# Patient Record
Sex: FEMALE | Race: WHITE | NOT HISPANIC OR LATINO | Employment: UNEMPLOYED | URBAN - METROPOLITAN AREA
[De-identification: names, ages, dates, MRNs, and addresses within clinical notes are randomized per-mention and may not be internally consistent; named-entity substitution may affect disease eponyms.]

---

## 2018-03-13 ENCOUNTER — OFFICE VISIT (OUTPATIENT)
Dept: OBSTETRICS AND GYNECOLOGY | Facility: CLINIC | Age: 48
End: 2018-03-13
Payer: MEDICAID

## 2018-03-13 VITALS
BODY MASS INDEX: 41.67 KG/M2 | HEIGHT: 63 IN | HEART RATE: 76 BPM | WEIGHT: 235.19 LBS | SYSTOLIC BLOOD PRESSURE: 132 MMHG | RESPIRATION RATE: 16 BRPM | DIASTOLIC BLOOD PRESSURE: 84 MMHG

## 2018-03-13 DIAGNOSIS — Z12.4 CERVICAL CANCER SCREENING: ICD-10-CM

## 2018-03-13 DIAGNOSIS — Z12.39 SCREENING FOR BREAST CANCER: ICD-10-CM

## 2018-03-13 DIAGNOSIS — N95.1 PERIMENOPAUSAL VASOMOTOR SYMPTOMS: ICD-10-CM

## 2018-03-13 DIAGNOSIS — Z01.419 WELL WOMAN EXAM WITH ROUTINE GYNECOLOGICAL EXAM: Primary | ICD-10-CM

## 2018-03-13 DIAGNOSIS — N75.0 CYST OF RIGHT BARTHOLIN'S GLAND: ICD-10-CM

## 2018-03-13 PROCEDURE — 99999 PR PBB SHADOW E&M-EST. PATIENT-LVL III: CPT | Mod: PBBFAC,,, | Performed by: OBSTETRICS & GYNECOLOGY

## 2018-03-13 PROCEDURE — 87624 HPV HI-RISK TYP POOLED RSLT: CPT

## 2018-03-13 PROCEDURE — 99386 PREV VISIT NEW AGE 40-64: CPT | Mod: S$PBB,,, | Performed by: OBSTETRICS & GYNECOLOGY

## 2018-03-13 PROCEDURE — 88175 CYTOPATH C/V AUTO FLUID REDO: CPT

## 2018-03-13 PROCEDURE — 99213 OFFICE O/P EST LOW 20 MIN: CPT | Mod: PBBFAC,PN | Performed by: OBSTETRICS & GYNECOLOGY

## 2018-03-13 RX ORDER — LISINOPRIL 10 MG/1
10 TABLET ORAL DAILY
Status: ON HOLD | COMMUNITY
Start: 2018-02-20 | End: 2020-02-26 | Stop reason: HOSPADM

## 2018-03-13 RX ORDER — AMITRIPTYLINE HYDROCHLORIDE 25 MG/1
25 TABLET, FILM COATED ORAL NIGHTLY
COMMUNITY
Start: 2018-02-20

## 2018-03-13 RX ORDER — MELOXICAM 15 MG/1
15 TABLET ORAL DAILY
Status: ON HOLD | COMMUNITY
Start: 2018-02-20 | End: 2020-02-26 | Stop reason: HOSPADM

## 2018-03-13 RX ORDER — MEDROXYPROGESTERONE ACETATE 10 MG/1
10 TABLET ORAL DAILY
Qty: 30 TABLET | Refills: 1 | Status: SHIPPED | OUTPATIENT
Start: 2018-03-13 | End: 2018-09-25 | Stop reason: SDUPTHER

## 2018-03-13 RX ORDER — METFORMIN HYDROCHLORIDE 500 MG/1
2 TABLET, EXTENDED RELEASE ORAL DAILY
Status: ON HOLD | COMMUNITY
Start: 2018-02-20 | End: 2021-08-19 | Stop reason: HOSPADM

## 2018-03-13 RX ORDER — ATORVASTATIN CALCIUM 20 MG/1
20 TABLET, FILM COATED ORAL DAILY
COMMUNITY
Start: 2018-02-20 | End: 2020-07-27

## 2018-03-13 RX ORDER — ATENOLOL 50 MG/1
50 TABLET ORAL DAILY
Status: ON HOLD | COMMUNITY
Start: 2018-02-20 | End: 2020-02-26 | Stop reason: HOSPADM

## 2018-03-13 RX ORDER — ESTRADIOL 1 MG/1
1 TABLET ORAL DAILY
Qty: 30 TABLET | Refills: 1 | Status: SHIPPED | OUTPATIENT
Start: 2018-03-13 | End: 2018-09-25 | Stop reason: SDUPTHER

## 2018-03-13 NOTE — LETTER
March 13, 2018      Les Bryant MD  [address] [facility]  [place] [address]           [facility] [place]  [address] [place] [address]  [place] [address]  Phone: [phone]          Patient: Laura Lee   MR Number: [id number]   YOB: 1970   Date of Visit: 3/13/2018       Dear Dr. Les Bryant:    Thank you for referring Laura Lee to me for evaluation. Attached you will find relevant portions of my assessment and plan of care.    If you have questions, please do not hesitate to call me. I look forward to following Laura Lee along with you.    Sincerely,    Tiffany Raymond MD    Enclosure  CC:  No Recipients    If you would like to receive this communication electronically, please contact [email or web address] or [phone] to request more information on [employer] access.    For providers and/or their staff who would like to refer a patient to [facility], please contact us through our one-stop-shop provider referral line, [facility], at [phone].    If you feel you have received this communication in error or would no longer like to receive these types of communications, please e-mail [email or web address]

## 2018-03-13 NOTE — PROGRESS NOTES
Subjective:    Patient ID: Laura Lee is a 47 y.o. y.o. female.     Chief Complaint: Annual Well Woman Exam     History of Present Illness:  Laura presents today for Annual Well Woman exam. She describes her menses as every few months and light in nature .She denies pelvic pain.  She denies breast tenderness, masses, nipple discharge. She denies difficulty with urination or bowel movements. She admits to menopausal symptoms such as hotflashes, vaginal dryness, and night sweats. She denies bloating, early satiety, or weight changes. She is not currently sexually active. Contraception is by no method.    Patient reports facial hair growth, hot flashes, and night sweats. She is interested in HRT. Discussed R/B/A. Patient also reports having a right bartholin gland cyst present for many years that is not bothersome.     A full discussion of the benefit-risk ratio of hormonal replacement therapy was carried out. Improvement in vasomotor and other climacteric symptoms were discussed, including possible improvements in sleep and mood. Reduction of risk for osteoporosis was explained. We discussed the study data showing increased risk of thrombo-embolic events such as myocardial infarction, stroke and also possibly breast cancer with estrogen replacement, and how this might affect her. The range of side effects such as breast tenderness, weight gain and including possible increases in lifetime risk of breast cancer and possible thrombotic complications was discussed. We also discussed ACOG's recommendation to use hormone replacement therapy for the relief of hot flashes alone and to be on the lowest dose possible for the shortest amount of time.  Alternative such as herbal and soy-based products were reviewed as well as behavioral modifications and non hormonal prescription medications. All of her questions about this therapy were answered.      Menstrual History:   Patient's last menstrual period was 01/09/2018  "(within months)..     OB History      Para Term  AB Living    3 3 3          SAB TAB Ectopic Multiple Live Births                       The following portions of the patient's history were reviewed and updated as appropriate: allergies, current medications, past family history, past medical history, past social history, past surgical history and problem list.        ROS:     Review of Systems   Constitutional: Negative for activity change, appetite change, chills, diaphoresis, fatigue, fever and unexpected weight change.   HENT: Negative for mouth sores and tinnitus.    Eyes: Negative for discharge and visual disturbance.   Respiratory: Negative for cough, shortness of breath and wheezing.    Cardiovascular: Negative for chest pain, palpitations and leg swelling.   Gastrointestinal: Negative for abdominal pain, bloating, blood in stool, constipation, diarrhea, nausea and vomiting.   Endocrine: Negative for diabetes, hair loss, hot flashes, hyperthyroidism and hypothyroidism.   Genitourinary: Negative for dysuria, flank pain, frequency, genital sores, hematuria, menorrhagia, menstrual problem, pelvic pain, urgency, vaginal bleeding, vaginal discharge, vaginal pain, urinary incontinence, postcoital bleeding, postmenopausal bleeding and vaginal odor.   Musculoskeletal: Negative for back pain, joint swelling and myalgias.   Skin:  Negative for rash and no acne.   Neurological: Negative for seizures, syncope, numbness and headaches.   Hematological: Negative for adenopathy. Does not bruise/bleed easily.   Psychiatric/Behavioral: Negative for depression and sleep disturbance. The patient is not nervous/anxious.    Breast: Negative for breast mass, breast pain, nipple discharge and skin changes      Objective:    Vital Signs:  Vitals:    18 1043   BP: 132/84   Pulse: 76   Resp: 16   Weight: 106.7 kg (235 lb 3.2 oz)   Height: 5' 3" (1.6 m)         Physical Exam:  General:  alert, cooperative, appears " stated age   Skin:  Skin color, texture, turgor normal. No rashes or lesions   HEENT:  conjunctivae/corneas clear. PERRL.   Neck: supple, trachea midline, no adenopathy or thyromegally   Respiratory:  clear to auscultation bilaterally   Heart:  regular rate and rhythm, S1, S2 normal, no murmur, click, rub or gallop   Breasts:  no discharge, erythema, or tenderness   Abdomen:  normal findings: bowel sounds normal, no masses palpable, no organomegaly and soft, non-tender   Pelvis: External genitalia: normal general appearance, right bartholin gland cyst ~ 1 cm  Urinary system: urethral meatus normal, bladder nontender  Vaginal: normal mucosa without prolapse or lesions  Cervix: normal appearance  Uterus: normal size, shape, position  Adnexa: normal size, nontender bilaterally   Extremities: Normal ROM; no edema, no cyanosis   Neurologial: Normal strength and tone. No focal numbness or weakness. Reflexes 2+ and equal.   Psychiatric: normal mood, speech, dress, and thought processes         Assessment:       Healthy female exam.     1. Well woman exam with routine gynecological exam    2. Screening for breast cancer    3. Cervical cancer screening    4. Perimenopausal vasomotor symptoms    5. Cyst of right Bartholin's gland          Plan:       Thin prep Pap smear.    HPV cotesting  MMG ordered  Rx of Estradiol/Provera  Colonoscopy needed next year  Discussed weight bearing exercise, calcium, and vitamin d for osteoporosis prevention  RTC in 1 month for follow up    COUNSELING:  Laura was counseled on STD pevention, use and side-effects of various contraceptive measures, A.C.O.G. Pap guidelines and recommendations for yearly pelvic exams in addition to recommendations for monthly self breast exams; to see her PCP for other health maintenance.

## 2018-03-16 LAB
HPV16 AG SPEC QL: NEGATIVE
HPV16+18+H RISK 12 DNA CVX-IMP: NEGATIVE
HPV18 DNA SPEC QL NAA+PROBE: NEGATIVE

## 2018-09-25 RX ORDER — MEDROXYPROGESTERONE ACETATE 10 MG/1
10 TABLET ORAL DAILY
Qty: 30 TABLET | Refills: 1 | Status: SHIPPED | OUTPATIENT
Start: 2018-09-25 | End: 2019-02-25 | Stop reason: SDUPTHER

## 2018-09-25 RX ORDER — ESTRADIOL 1 MG/1
1 TABLET ORAL DAILY
Qty: 30 TABLET | Refills: 1 | Status: SHIPPED | OUTPATIENT
Start: 2018-09-25 | End: 2019-02-25 | Stop reason: SDUPTHER

## 2019-02-25 RX ORDER — ESTRADIOL 1 MG/1
1 TABLET ORAL DAILY
Qty: 30 TABLET | Refills: 1 | Status: SHIPPED | OUTPATIENT
Start: 2019-02-25 | End: 2020-02-25

## 2019-02-25 RX ORDER — MEDROXYPROGESTERONE ACETATE 10 MG/1
10 TABLET ORAL DAILY
Qty: 30 TABLET | Refills: 1 | Status: SHIPPED | OUTPATIENT
Start: 2019-02-25 | End: 2020-07-27

## 2019-02-25 NOTE — TELEPHONE ENCOUNTER
Laura desire refill of Estradiol and Provera. Last annual 3/13/18. Dr. Beltre is out of clinic, message sent to physician on call.    There is no problem list on file for this patient.    Prior to Admission medications    Medication Sig Start Date End Date Taking? Authorizing Provider   amitriptyline (ELAVIL) 25 MG tablet Take 25 mg by mouth every evening.  2/20/18   Historical Provider, MD   atenolol (TENORMIN) 50 MG tablet Take 50 mg by mouth once daily.  2/20/18   Historical Provider, MD   atorvastatin (LIPITOR) 20 MG tablet Take 20 mg by mouth once daily.  2/20/18   Historical Provider, MD   estradiol (ESTRACE) 1 MG tablet Take 1 tablet (1 mg total) by mouth once daily. 9/25/18 9/25/19  Tiffany Raymond MD   lisinopril 10 MG tablet Take 10 mg by mouth once daily.  2/20/18   Historical Provider, MD   medroxyPROGESTERone (PROVERA) 10 MG tablet Take 1 tablet (10 mg total) by mouth once daily. 9/25/18 9/25/19  Tiffany Raymond MD   meloxicam (MOBIC) 15 MG tablet Take 15 mg by mouth once daily.  2/20/18   Historical Provider, MD   metFORMIN (GLUCOPHAGE-XR) 500 MG 24 hr tablet Take 2 tablets by mouth once daily. 2/20/18   Historical Provider, MD

## 2019-02-25 NOTE — TELEPHONE ENCOUNTER
----- Message from Inna Conley sent at 2/25/2019 11:29 AM CST -----  Contact: self  Laura Lee  MRN: [id number]  Home Phone      [phone]  Work Phone      Not on file.  Mobile          [phone]    Patient Care Team:  Les Bryant MD as PCP - General (Family Medicine)  Tiffany Raymond MD as Obstetrician (Obstetrics and Gynecology)  OB? No  What phone number can you be reached at? [phone]  Message:  Pt states she needs refills on her medication but doesn't know the names of the medications she needs refilled. Please return call.   Pharmacy: [facility] [place]

## 2020-02-24 ENCOUNTER — HOSPITAL ENCOUNTER (OUTPATIENT)
Facility: HOSPITAL | Age: 50
Discharge: HOME | End: 2020-02-26
Attending: INTERNAL MEDICINE | Admitting: INTERNAL MEDICINE
Payer: MEDICAID

## 2020-02-24 DIAGNOSIS — R00.1 BRADYCARDIA: Primary | ICD-10-CM

## 2020-02-24 DIAGNOSIS — R00.1 SYMPTOMATIC SINUS BRADYCARDIA: ICD-10-CM

## 2020-02-24 DIAGNOSIS — E11.9 DMII (DIABETES MELLITUS, TYPE 2): ICD-10-CM

## 2020-02-24 DIAGNOSIS — E11.65 TYPE 2 DIABETES MELLITUS WITH HYPERGLYCEMIA, WITHOUT LONG-TERM CURRENT USE OF INSULIN: ICD-10-CM

## 2020-02-24 DIAGNOSIS — I48.91 ATRIAL FIBRILLATION: ICD-10-CM

## 2020-02-24 DIAGNOSIS — I50.9 ACUTE HEART FAILURE: ICD-10-CM

## 2020-02-24 LAB
ANION GAP SERPL CALC-SCNC: 13 MMOL/L (ref 8–16)
BASOPHILS # BLD AUTO: 0.03 K/UL (ref 0–0.2)
BASOPHILS NFR BLD: 0.4 % (ref 0–1.9)
BNP SERPL-MCNC: 152 PG/ML (ref 0–99)
BUN SERPL-MCNC: 28 MG/DL (ref 6–20)
CALCIUM SERPL-MCNC: 9.5 MG/DL (ref 8.7–10.5)
CHLORIDE SERPL-SCNC: 104 MMOL/L (ref 95–110)
CO2 SERPL-SCNC: 19 MMOL/L (ref 23–29)
CREAT SERPL-MCNC: 1.1 MG/DL (ref 0.5–1.4)
DIFFERENTIAL METHOD: ABNORMAL
EOSINOPHIL # BLD AUTO: 0 K/UL (ref 0–0.5)
EOSINOPHIL NFR BLD: 0 % (ref 0–8)
ERYTHROCYTE [DISTWIDTH] IN BLOOD BY AUTOMATED COUNT: 12.3 % (ref 11.5–14.5)
EST. GFR  (AFRICAN AMERICAN): >60 ML/MIN/1.73 M^2
EST. GFR  (NON AFRICAN AMERICAN): 58.3 ML/MIN/1.73 M^2
GLUCOSE SERPL-MCNC: 444 MG/DL (ref 70–110)
HCT VFR BLD AUTO: 37.8 % (ref 37–48.5)
HGB BLD-MCNC: 12.3 G/DL (ref 12–16)
IMM GRANULOCYTES # BLD AUTO: 0.2 K/UL (ref 0–0.04)
IMM GRANULOCYTES NFR BLD AUTO: 2.7 % (ref 0–0.5)
INR PPP: 1 (ref 0.8–1.2)
LYMPHOCYTES # BLD AUTO: 1.1 K/UL (ref 1–4.8)
LYMPHOCYTES NFR BLD: 15.1 % (ref 18–48)
MCH RBC QN AUTO: 31.1 PG (ref 27–31)
MCHC RBC AUTO-ENTMCNC: 32.5 G/DL (ref 32–36)
MCV RBC AUTO: 96 FL (ref 82–98)
MONOCYTES # BLD AUTO: 0.4 K/UL (ref 0.3–1)
MONOCYTES NFR BLD: 5.5 % (ref 4–15)
NEUTROPHILS # BLD AUTO: 5.7 K/UL (ref 1.8–7.7)
NEUTROPHILS NFR BLD: 76.3 % (ref 38–73)
NRBC BLD-RTO: 0 /100 WBC
PLATELET # BLD AUTO: 167 K/UL (ref 150–350)
PMV BLD AUTO: 11.6 FL (ref 9.2–12.9)
POCT GLUCOSE: 426 MG/DL (ref 70–110)
POTASSIUM SERPL-SCNC: 4.5 MMOL/L (ref 3.5–5.1)
PROTHROMBIN TIME: 10.4 SEC (ref 9–12.5)
RBC # BLD AUTO: 3.95 M/UL (ref 4–5.4)
SODIUM SERPL-SCNC: 136 MMOL/L (ref 136–145)
WBC # BLD AUTO: 7.49 K/UL (ref 3.9–12.7)

## 2020-02-24 PROCEDURE — 93010 EKG 12-LEAD: ICD-10-PCS | Mod: ,,, | Performed by: INTERNAL MEDICINE

## 2020-02-24 PROCEDURE — 93005 ELECTROCARDIOGRAM TRACING: CPT

## 2020-02-24 PROCEDURE — 80048 BASIC METABOLIC PNL TOTAL CA: CPT

## 2020-02-24 PROCEDURE — 20600001 HC STEP DOWN PRIVATE ROOM

## 2020-02-24 PROCEDURE — 99221 PR INITIAL HOSPITAL CARE,LEVL I: ICD-10-PCS | Mod: ,,, | Performed by: INTERNAL MEDICINE

## 2020-02-24 PROCEDURE — G0378 HOSPITAL OBSERVATION PER HR: HCPCS

## 2020-02-24 PROCEDURE — 85025 COMPLETE CBC W/AUTO DIFF WBC: CPT

## 2020-02-24 PROCEDURE — 83880 ASSAY OF NATRIURETIC PEPTIDE: CPT

## 2020-02-24 PROCEDURE — A4216 STERILE WATER/SALINE, 10 ML: HCPCS | Performed by: STUDENT IN AN ORGANIZED HEALTH CARE EDUCATION/TRAINING PROGRAM

## 2020-02-24 PROCEDURE — G0379 DIRECT REFER HOSPITAL OBSERV: HCPCS

## 2020-02-24 PROCEDURE — 99221 1ST HOSP IP/OBS SF/LOW 40: CPT | Mod: ,,, | Performed by: INTERNAL MEDICINE

## 2020-02-24 PROCEDURE — 83036 HEMOGLOBIN GLYCOSYLATED A1C: CPT

## 2020-02-24 PROCEDURE — 25000003 PHARM REV CODE 250: Performed by: STUDENT IN AN ORGANIZED HEALTH CARE EDUCATION/TRAINING PROGRAM

## 2020-02-24 PROCEDURE — 36415 COLL VENOUS BLD VENIPUNCTURE: CPT

## 2020-02-24 PROCEDURE — 85610 PROTHROMBIN TIME: CPT

## 2020-02-24 PROCEDURE — 96374 THER/PROPH/DIAG INJ IV PUSH: CPT | Mod: 59 | Performed by: INTERNAL MEDICINE

## 2020-02-24 PROCEDURE — 93010 ELECTROCARDIOGRAM REPORT: CPT | Mod: ,,, | Performed by: INTERNAL MEDICINE

## 2020-02-24 PROCEDURE — 63600175 PHARM REV CODE 636 W HCPCS: Performed by: STUDENT IN AN ORGANIZED HEALTH CARE EDUCATION/TRAINING PROGRAM

## 2020-02-24 RX ORDER — FUROSEMIDE 10 MG/ML
40 INJECTION INTRAMUSCULAR; INTRAVENOUS ONCE
Status: COMPLETED | OUTPATIENT
Start: 2020-02-25 | End: 2020-02-24

## 2020-02-24 RX ORDER — TALC
6 POWDER (GRAM) TOPICAL NIGHTLY PRN
Status: DISCONTINUED | OUTPATIENT
Start: 2020-02-24 | End: 2020-02-26 | Stop reason: HOSPADM

## 2020-02-24 RX ORDER — IBUPROFEN 200 MG
1 TABLET ORAL DAILY
Status: DISCONTINUED | OUTPATIENT
Start: 2020-02-25 | End: 2020-02-26 | Stop reason: HOSPADM

## 2020-02-24 RX ORDER — ACETAMINOPHEN 500 MG
500 TABLET ORAL EVERY 6 HOURS PRN
Status: DISCONTINUED | OUTPATIENT
Start: 2020-02-24 | End: 2020-02-24

## 2020-02-24 RX ORDER — ENOXAPARIN SODIUM 100 MG/ML
40 INJECTION SUBCUTANEOUS EVERY 24 HOURS
Status: DISCONTINUED | OUTPATIENT
Start: 2020-02-25 | End: 2020-02-25

## 2020-02-24 RX ORDER — AMOXICILLIN 250 MG
1 CAPSULE ORAL DAILY PRN
Status: DISCONTINUED | OUTPATIENT
Start: 2020-02-24 | End: 2020-02-26 | Stop reason: HOSPADM

## 2020-02-24 RX ORDER — INSULIN ASPART 100 [IU]/ML
1-10 INJECTION, SOLUTION INTRAVENOUS; SUBCUTANEOUS
Status: DISCONTINUED | OUTPATIENT
Start: 2020-02-24 | End: 2020-02-26 | Stop reason: HOSPADM

## 2020-02-24 RX ORDER — IBUPROFEN 200 MG
24 TABLET ORAL
Status: DISCONTINUED | OUTPATIENT
Start: 2020-02-24 | End: 2020-02-26 | Stop reason: HOSPADM

## 2020-02-24 RX ORDER — IBUPROFEN 200 MG
16 TABLET ORAL
Status: DISCONTINUED | OUTPATIENT
Start: 2020-02-24 | End: 2020-02-26 | Stop reason: HOSPADM

## 2020-02-24 RX ORDER — SODIUM CHLORIDE 0.9 % (FLUSH) 0.9 %
3 SYRINGE (ML) INJECTION EVERY 8 HOURS
Status: DISCONTINUED | OUTPATIENT
Start: 2020-02-24 | End: 2020-02-26 | Stop reason: HOSPADM

## 2020-02-24 RX ORDER — ONDANSETRON 8 MG/1
8 TABLET, ORALLY DISINTEGRATING ORAL EVERY 8 HOURS PRN
Status: DISCONTINUED | OUTPATIENT
Start: 2020-02-24 | End: 2020-02-26 | Stop reason: HOSPADM

## 2020-02-24 RX ORDER — GLUCAGON 1 MG
1 KIT INJECTION
Status: DISCONTINUED | OUTPATIENT
Start: 2020-02-24 | End: 2020-02-26 | Stop reason: HOSPADM

## 2020-02-24 RX ORDER — ACETAMINOPHEN 325 MG/1
650 TABLET ORAL EVERY 6 HOURS PRN
Status: DISCONTINUED | OUTPATIENT
Start: 2020-02-24 | End: 2020-02-26 | Stop reason: HOSPADM

## 2020-02-24 RX ADMIN — FUROSEMIDE 40 MG: 10 INJECTION, SOLUTION INTRAMUSCULAR; INTRAVENOUS at 11:02

## 2020-02-24 RX ADMIN — Medication 3 ML: at 11:02

## 2020-02-24 RX ADMIN — INSULIN ASPART 5 UNITS: 100 INJECTION, SOLUTION INTRAVENOUS; SUBCUTANEOUS at 11:02

## 2020-02-24 RX ADMIN — ACETAMINOPHEN 650 MG: 325 TABLET ORAL at 10:02

## 2020-02-24 RX ADMIN — Medication 6 MG: at 10:02

## 2020-02-25 PROBLEM — I48.91 ATRIAL FIBRILLATION WITH RVR: Status: ACTIVE | Noted: 2020-02-25

## 2020-02-25 PROBLEM — I10 ESSENTIAL HYPERTENSION: Status: ACTIVE | Noted: 2020-02-25

## 2020-02-25 PROBLEM — Z72.0 TOBACCO ABUSE: Status: ACTIVE | Noted: 2020-02-25

## 2020-02-25 PROBLEM — I49.5 TACHY-BRADY SYNDROME: Status: ACTIVE | Noted: 2020-02-25

## 2020-02-25 LAB
ANION GAP SERPL CALC-SCNC: 11 MMOL/L (ref 8–16)
BASOPHILS # BLD AUTO: 0.04 K/UL (ref 0–0.2)
BASOPHILS NFR BLD: 0.5 % (ref 0–1.9)
BUN SERPL-MCNC: 24 MG/DL (ref 6–20)
CALCIUM SERPL-MCNC: 10.2 MG/DL (ref 8.7–10.5)
CHLORIDE SERPL-SCNC: 100 MMOL/L (ref 95–110)
CHOLEST SERPL-MCNC: 154 MG/DL (ref 120–199)
CHOLEST/HDLC SERPL: 3.8 {RATIO} (ref 2–5)
CO2 SERPL-SCNC: 27 MMOL/L (ref 23–29)
CREAT SERPL-MCNC: 1 MG/DL (ref 0.5–1.4)
DIFFERENTIAL METHOD: ABNORMAL
EOSINOPHIL # BLD AUTO: 0 K/UL (ref 0–0.5)
EOSINOPHIL NFR BLD: 0.1 % (ref 0–8)
ERYTHROCYTE [DISTWIDTH] IN BLOOD BY AUTOMATED COUNT: 12.4 % (ref 11.5–14.5)
EST. GFR  (AFRICAN AMERICAN): >60 ML/MIN/1.73 M^2
EST. GFR  (NON AFRICAN AMERICAN): >60 ML/MIN/1.73 M^2
GLUCOSE SERPL-MCNC: 383 MG/DL (ref 70–110)
HCT VFR BLD AUTO: 39.5 % (ref 37–48.5)
HDLC SERPL-MCNC: 41 MG/DL (ref 40–75)
HDLC SERPL: 26.6 % (ref 20–50)
HGB BLD-MCNC: 12.9 G/DL (ref 12–16)
IMM GRANULOCYTES # BLD AUTO: 0.09 K/UL (ref 0–0.04)
IMM GRANULOCYTES NFR BLD AUTO: 1.2 % (ref 0–0.5)
LDLC SERPL CALC-MCNC: 60.2 MG/DL (ref 63–159)
LYMPHOCYTES # BLD AUTO: 2.2 K/UL (ref 1–4.8)
LYMPHOCYTES NFR BLD: 30.2 % (ref 18–48)
MCH RBC QN AUTO: 31 PG (ref 27–31)
MCHC RBC AUTO-ENTMCNC: 32.7 G/DL (ref 32–36)
MCV RBC AUTO: 95 FL (ref 82–98)
MONOCYTES # BLD AUTO: 0.5 K/UL (ref 0.3–1)
MONOCYTES NFR BLD: 6.7 % (ref 4–15)
NEUTROPHILS # BLD AUTO: 4.5 K/UL (ref 1.8–7.7)
NEUTROPHILS NFR BLD: 61.3 % (ref 38–73)
NONHDLC SERPL-MCNC: 113 MG/DL
NRBC BLD-RTO: 0 /100 WBC
PLATELET # BLD AUTO: 190 K/UL (ref 150–350)
PMV BLD AUTO: 11.8 FL (ref 9.2–12.9)
POCT GLUCOSE: 283 MG/DL (ref 70–110)
POCT GLUCOSE: 342 MG/DL (ref 70–110)
POCT GLUCOSE: 399 MG/DL (ref 70–110)
POTASSIUM SERPL-SCNC: 4 MMOL/L (ref 3.5–5.1)
RBC # BLD AUTO: 4.16 M/UL (ref 4–5.4)
SODIUM SERPL-SCNC: 138 MMOL/L (ref 136–145)
TRIGL SERPL-MCNC: 264 MG/DL (ref 30–150)
WBC # BLD AUTO: 7.36 K/UL (ref 3.9–12.7)

## 2020-02-25 PROCEDURE — 99222 1ST HOSP IP/OBS MODERATE 55: CPT | Mod: ,,, | Performed by: INTERNAL MEDICINE

## 2020-02-25 PROCEDURE — S4991 NICOTINE PATCH NONLEGEND: HCPCS | Performed by: STUDENT IN AN ORGANIZED HEALTH CARE EDUCATION/TRAINING PROGRAM

## 2020-02-25 PROCEDURE — 36415 COLL VENOUS BLD VENIPUNCTURE: CPT

## 2020-02-25 PROCEDURE — A4216 STERILE WATER/SALINE, 10 ML: HCPCS | Performed by: STUDENT IN AN ORGANIZED HEALTH CARE EDUCATION/TRAINING PROGRAM

## 2020-02-25 PROCEDURE — 93005 ELECTROCARDIOGRAM TRACING: CPT

## 2020-02-25 PROCEDURE — 80048 BASIC METABOLIC PNL TOTAL CA: CPT

## 2020-02-25 PROCEDURE — 20600001 HC STEP DOWN PRIVATE ROOM

## 2020-02-25 PROCEDURE — 63600175 PHARM REV CODE 636 W HCPCS: Performed by: STUDENT IN AN ORGANIZED HEALTH CARE EDUCATION/TRAINING PROGRAM

## 2020-02-25 PROCEDURE — G0378 HOSPITAL OBSERVATION PER HR: HCPCS

## 2020-02-25 PROCEDURE — 99232 PR SUBSEQUENT HOSPITAL CARE,LEVL II: ICD-10-PCS | Mod: ,,, | Performed by: INTERNAL MEDICINE

## 2020-02-25 PROCEDURE — 85025 COMPLETE CBC W/AUTO DIFF WBC: CPT

## 2020-02-25 PROCEDURE — 80061 LIPID PANEL: CPT

## 2020-02-25 PROCEDURE — 25000003 PHARM REV CODE 250: Performed by: STUDENT IN AN ORGANIZED HEALTH CARE EDUCATION/TRAINING PROGRAM

## 2020-02-25 PROCEDURE — 99222 PR INITIAL HOSPITAL CARE,LEVL II: ICD-10-PCS | Mod: ,,, | Performed by: INTERNAL MEDICINE

## 2020-02-25 PROCEDURE — 99232 SBSQ HOSP IP/OBS MODERATE 35: CPT | Mod: ,,, | Performed by: INTERNAL MEDICINE

## 2020-02-25 PROCEDURE — 93010 ELECTROCARDIOGRAM REPORT: CPT | Mod: ,,, | Performed by: INTERNAL MEDICINE

## 2020-02-25 PROCEDURE — 93010 ELECTROCARDIOGRAM REPORT: CPT | Mod: 76,,, | Performed by: INTERNAL MEDICINE

## 2020-02-25 PROCEDURE — 25000003 PHARM REV CODE 250: Performed by: INTERNAL MEDICINE

## 2020-02-25 PROCEDURE — 93010 EKG 12-LEAD: ICD-10-PCS | Mod: 76,,, | Performed by: INTERNAL MEDICINE

## 2020-02-25 RX ORDER — DIPHENHYDRAMINE HCL 25 MG
25 CAPSULE ORAL ONCE
Status: COMPLETED | OUTPATIENT
Start: 2020-02-25 | End: 2020-02-25

## 2020-02-25 RX ORDER — ENOXAPARIN SODIUM 100 MG/ML
97 INJECTION SUBCUTANEOUS EVERY 12 HOURS
Status: DISCONTINUED | OUTPATIENT
Start: 2020-02-25 | End: 2020-02-26

## 2020-02-25 RX ORDER — TRAMADOL HYDROCHLORIDE 50 MG/1
50 TABLET ORAL EVERY 6 HOURS PRN
Status: DISCONTINUED | OUTPATIENT
Start: 2020-02-25 | End: 2020-02-26 | Stop reason: HOSPADM

## 2020-02-25 RX ORDER — AMLODIPINE BESYLATE 5 MG/1
5 TABLET ORAL DAILY
Status: DISCONTINUED | OUTPATIENT
Start: 2020-02-26 | End: 2020-02-26 | Stop reason: HOSPADM

## 2020-02-25 RX ADMIN — Medication 3 ML: at 09:02

## 2020-02-25 RX ADMIN — INSULIN ASPART 4 UNITS: 100 INJECTION, SOLUTION INTRAVENOUS; SUBCUTANEOUS at 09:02

## 2020-02-25 RX ADMIN — DIPHENHYDRAMINE HYDROCHLORIDE 25 MG: 25 CAPSULE ORAL at 07:02

## 2020-02-25 RX ADMIN — ENOXAPARIN SODIUM 100 MG: 100 INJECTION SUBCUTANEOUS at 05:02

## 2020-02-25 RX ADMIN — INSULIN ASPART 8 UNITS: 100 INJECTION, SOLUTION INTRAVENOUS; SUBCUTANEOUS at 07:02

## 2020-02-25 RX ADMIN — TRAMADOL HYDROCHLORIDE 50 MG: 50 TABLET, FILM COATED ORAL at 11:02

## 2020-02-25 RX ADMIN — INSULIN ASPART 6 UNITS: 100 INJECTION, SOLUTION INTRAVENOUS; SUBCUTANEOUS at 04:02

## 2020-02-25 RX ADMIN — Medication 6 MG: at 09:02

## 2020-02-25 RX ADMIN — DIPHENHYDRAMINE HYDROCHLORIDE 25 MG: 25 CAPSULE ORAL at 05:02

## 2020-02-25 RX ADMIN — ACETAMINOPHEN 650 MG: 325 TABLET ORAL at 09:02

## 2020-02-25 RX ADMIN — NICOTINE 1 PATCH: 21 PATCH, EXTENDED RELEASE TRANSDERMAL at 08:02

## 2020-02-25 RX ADMIN — INSULIN ASPART 10 UNITS: 100 INJECTION, SOLUTION INTRAVENOUS; SUBCUTANEOUS at 11:02

## 2020-02-25 NOTE — ASSESSMENT & PLAN NOTE
- Patient admitted with asymptomatic sinus janie with HR as low as 39, afib RVR this AM and then NSR with -105  - She reports dizziness, however, unclear if this is a results of orthostasis or due to atrial fibrillation  - Unable to determine from telemetry if she had a conversion pause due to artifact  - [id number] is 3, continue anticoagulation with enoxaparin for now, can switch to apixaban in the next few days  - Overall we need more data to determine if she has tachy-janie syndrome. Continue to monitor on telemetry   - Follow echocardiogram and stress test

## 2020-02-25 NOTE — PLAN OF CARE
Pt remains free of falls/trauma/injures. No complaints of CP/SOB/discomfort. Pt BG is>400; PRN insulin ordered. Noted to be in SB 30-40's; pacer pads placed. IVP lasix 40mg x1 given. Severe itching noted; PRN Benadryl order to be placed. Transferred to [facility] for possible pacemaker placement. VSS. Fall bundle in place. POC explained, no questions at this time. Pt tolerating care.

## 2020-02-25 NOTE — PROGRESS NOTES
"[facility]  Cardiology  Progress Note    Patient Name: Laura Lee  MRN: [id number]  Admission Date: 2/24/2020  Hospital Length of Stay: 1 days  Code Status: Full Code   Attending Physician: Ning Carey MD   Primary Care Physician: Les Bryant MD  Expected Discharge Date:   Principal Problem:Symptomatic sinus bradycardia    Subjective:     Hospital Course:   Patient was admitted to CCU for symptomatic bradycardia and found to be in Afib with RVR. She was started on therapeutic Lovenox as she is not on any prior anticoagulation. EP consulted for possible pacemaker placement. Nuclear stress test ordered as patient has many risk factors (DMII, smoker, HTN) for CAD.     Interval History: Patient reports feeling SOB on exertion and dizziness. She also says she has been dealing with "hives" all over body for years. After patient walked up and down hallway, HR went up to 165.    Review of Systems   Constitution: Negative for chills and fever.   Eyes: Negative for double vision and pain.   Cardiovascular: Negative for chest pain, dyspnea on exertion, irregular heartbeat, near-syncope and syncope.   Respiratory: Positive for shortness of breath (on exertion). Negative for cough.    Skin: Positive for itching. Negative for color change.   Musculoskeletal: Negative for back pain and falls.   Gastrointestinal: Negative for abdominal pain and nausea.   Neurological: Negative for headaches and weakness.   Psychiatric/Behavioral: Negative for altered mental status and hallucinations.     Objective:     Vital Signs (Most Recent):  Temp: 97.8 °F (36.6 °C) (02/25/20 1140)  Pulse: 85 (02/25/20 1400)  Resp: 18 (02/25/20 1140)  BP: (!) 158/89 (02/25/20 1140)  SpO2: 96 % (02/25/20 1140) Vital Signs (24h Range):  Temp:  [97.6 °F (36.4 °C)-98.2 °F (36.8 °C)] 97.8 °F (36.6 °C)  Pulse:  [] 85  Resp:  [16-18] 18  SpO2:  [94 %-97 %] 96 %  BP: (131-181)/(67-89) 158/89     Weight: 96.5 kg (212 lb 11.9 " oz)  Body mass index is 37.69 kg/m².     SpO2: 96 %  O2 Device (Oxygen Therapy): room air      Intake/Output Summary (Last 24 hours) at 2/25/2020 1418  Last data filed at 2/25/2020 0500  Gross per 24 hour   Intake 240 ml   Output --   Net 240 ml       Lines/Drains/Airways     None                 Physical Exam   Constitutional: She is oriented to person, place, and time. She appears well-developed and well-nourished.   HENT:   Head: Normocephalic and atraumatic.   Eyes: Pupils are equal, round, and reactive to light.   Neck: No JVD present.   Cardiovascular: An irregularly irregular rhythm present.   No murmur heard.  Pulmonary/Chest: Effort normal and breath sounds normal. No respiratory distress.   Abdominal: Soft. Bowel sounds are normal. She exhibits no distension. There is no tenderness.   Musculoskeletal: She exhibits no edema.   Neurological: She is alert and oriented to person, place, and time.   Skin: Skin is warm and dry.   Hive-appearing lesions on arms and legs   Psychiatric: Her behavior is normal. Judgment and thought content normal.   Nursing note and vitals reviewed.      Significant Labs:   CMP   Recent Labs   Lab 02/24/20 2005 02/25/20  0422    138   K 4.5 4.0    100   CO2 19* 27   * 383*   BUN 28* 24*   CREATININE 1.1 1.0   CALCIUM 9.5 10.2   ANIONGAP 13 11   ESTGFRAFRICA >60.0 >60.0   EGFRNONAA 58.3* >60.0    and CBC   Recent Labs   Lab 02/24/20 2005 02/25/20  0422   WBC 7.49 7.36   HGB 12.3 12.9   HCT 37.8 39.5    190       Significant Imaging: EKG: A.fib with RVR    Assessment and Plan:     Brief HPI: 49F w/ HTN, DMII presents with symptomatic bradycardia and found to be in A.fib w/ RVR    * Symptomatic sinus bradycardia  HR ranging 30-60 bpm upon admission but found to be in A.fib with RVR the morning after admission. HR to 165 after walking up and down hallway.  - EP consulted for possible pacemaker, appreciate assistance  - Nuclear stress test ordered as patient  is smoker, diabetic, HTN  - will continue tele monitoring   - pacer pads in case of emergency    Atrial fibrillation with RVR  HR ranging 30-60 bpm upon admission but found to be in A.fib with RVR the morning after admission. HR to 165 after walking up and down hallway.  - EP consulted for possible pacemaker, appreciate assistance  - Therapeutic lovenox    Tobacco abuse  Patient is an active smoker. Encouraged cessation. Nicotine patch while in hospital.    Essential hypertension  Patient use to be on lisinopril but she says her doctor told her to stop taking it.   - Continue to monitor BP     T2DM (type 2 diabetes mellitus)  - on metformin at home  - diabetic diet  - A1c pending  - POCT AC & HS; MD SSI      Patient seen, and case discussed with staff, Dr. Carey. Attending attestation to follow.    VTE Risk Mitigation (From admission, onward)         Ordered     enoxaparin injection 100 mg  Every 12 hours      02/25/20 0937     IP VTE LOW RISK PATIENT  Once      02/24/20 1932     Place sequential compression device  Until discontinued      02/24/20 1932                Pooja Rondon MD  Cardiology  [facility]

## 2020-02-25 NOTE — H&P
[facility]  Cardiology  History and Physical     Patient Name: Laura Lee  MRN: [id number]  Admission Date: 2/24/2020  Code Status: Full Code   Attending Provider: Ning Carey MD   Primary Care Physician: Les Bryant MD  Principal Problem:Symptomatic sinus bradycardia    Patient information was obtained from patient and ER records.     Subjective:     Chief Complaint:  bradycardia     HPI:  Laura Lee is a 49 y.o. F with T2DM, HTN, who was transferred from [facility] for further evaluation of braydcardia. Pt reports working with [employer] and [employer] on Friday evening (2/21/20) after which she started to notice progressive swelling of extremities, headache and diffuse pruritis. She presented to the ER, where she was observed overnight and discharged the next day. Daughter reports that after went home, patient returned to the ER because symptoms had not resolved. At this point, they were given IV benadryl, steroids and epinepherine, which did improve her symptoms.   Pt was noted to incidentally develop bradycardia overnight on 2/23/20. She denies any chest pain or shortness of breath at that time. She was transferred her for further evaluation.     Pt reports SOB after mild exertion such as working around her house, 5-10 minutes of walking. She denies any chest pain with exertion. She is unable to lie flat because of chronic back pain and reports walking every few nights with SOB. She has never seen a cardiologist or had further workup of these symptoms. She does snore and had not been tested for sleep apnea.  She is non-compliant with her medications as prescribed. She reports a variable sleeping schedule i.e. sleeping during the daytime and not having an appetite for dinner therefore not taking her evening meds.     Past Medical History:   Diagnosis Date    Anxiety     Diabetes mellitus     GERD (gastroesophageal reflux disease)     Hyperlipidemia     Hypertension        Past Surgical  History:   Procedure Laterality Date    TUBAL LIGATION         Review of patient's allergies indicates:  No Known Allergies    No current facility-administered medications on file prior to encounter.      Current Outpatient Medications on File Prior to Encounter   Medication Sig    amitriptyline (ELAVIL) 25 MG tablet Take 25 mg by mouth every evening.     atenolol (TENORMIN) 50 MG tablet Take 50 mg by mouth once daily.     atorvastatin (LIPITOR) 20 MG tablet Take 20 mg by mouth once daily.     estradiol (ESTRACE) 1 MG tablet Take 1 tablet (1 mg total) by mouth once daily.    lisinopril 10 MG tablet Take 10 mg by mouth once daily.     medroxyPROGESTERone (PROVERA) 10 MG tablet Take 1 tablet (10 mg total) by mouth once daily.    meloxicam (MOBIC) 15 MG tablet Take 15 mg by mouth once daily.     metFORMIN (GLUCOPHAGE-XR) 500 MG 24 hr tablet Take 2 tablets by mouth once daily.     Family History     Problem Relation (Age of Onset)    Breast cancer Maternal Aunt    Cancer Mother        Tobacco Use    Smoking status: Current Every Day Smoker     Types: Vaping with nicotine    Smokeless tobacco: Never Used   Substance and Sexual Activity    Alcohol use: Yes     Comment: seldom    Drug use: No    Sexual activity: Yes     Partners: Male     Birth control/protection: See Surgical Hx     Comment: single     Review of Systems   Constitution: Negative for chills and fever.   Cardiovascular: Negative for chest pain, dyspnea on exertion, irregular heartbeat, near-syncope and syncope.   Respiratory: Negative for shortness of breath.    Gastrointestinal: Negative for nausea.   Neurological: Negative for headaches and weakness.   Psychiatric/Behavioral: Negative for altered mental status.     Objective:     Vital Signs (Most Recent):  Temp: 97.8 °F (36.6 °C) (02/24/20 1951)  Pulse: 65 (02/24/20 1951)  Resp: 18 (02/24/20 1951)  BP: (!) 152/74 (02/24/20 1951)  SpO2: 96 % (02/24/20 1951) Vital Signs (24h Range):  Temp:   [97.8 °F (36.6 °C)-98.2 °F (36.8 °C)] 97.8 °F (36.6 °C)  Pulse:  [37-65] 65  Resp:  [16-18] 18  SpO2:  [93 %-98 %] 96 %  BP: (152-181)/(74-81) 152/74        Body mass index is 41.66 kg/m².    SpO2: 96 %  O2 Device (Oxygen Therapy): room air    No intake or output data in the 24 hours ending 02/24/20 2304    Lines/Drains/Airways     None                 Physical Exam   Constitutional: She is oriented to person, place, and time. She appears well-developed and well-nourished.   HENT:   Head: Normocephalic and atraumatic.   Eyes: Pupils are equal, round, and reactive to light.   Neck: No JVD present.   Cardiovascular: Normal rate and regular rhythm.   No murmur heard.  Pulmonary/Chest: Effort normal and breath sounds normal. No respiratory distress.   Abdominal: Soft. Bowel sounds are normal. She exhibits no distension. There is no tenderness.   Musculoskeletal: She exhibits no edema.   Neurological: She is alert and oriented to person, place, and time.   Skin: Skin is warm and dry. No erythema.   Psychiatric: Her behavior is normal. Judgment and thought content normal.   Vitals reviewed.    Significant Labs:   CMP   Recent Labs   Lab 02/24/20 2005      K 4.5      CO2 19*   *   BUN 28*   CREATININE 1.1   CALCIUM 9.5   ANIONGAP 13   ESTGFRAFRICA >60.0   EGFRNONAA 58.3*    and CBC   Recent Labs   Lab 02/24/20 2005   WBC 7.49   HGB 12.3   HCT 37.8        Significant Imaging: All imaging in the last 24 hours reviewed    ECG 2/24/20: sinus janie, TWI in V1-V3 but no prior to compare if this is new or old finding     Assessment and Plan:     * Symptomatic sinus bradycardia  - HR ranging 30-60 bpm at this time; asymptomatic & hemodynamically stable at this time. Heart rate does respond appropriately with activity  - will continue tele monitoring   - pacer pads in case of emergency    T2DM (type 2 diabetes mellitus)  - diabetic diet  - A1c pending  - POCT AC & HS; MD THOMAS    Shortness of breath on  exertion  - pt reports SOBOE, PND, BNP of 152 despite morbid obesity and CXR suggestive of vascular congestion and cardiomegaly  - trial of IV lasix x 1  - strict in & out  - daily weights  - formal TTE ordered for AM    Tobacco user  - 1 ppd x 8 months and prior history of vaping  - nicotine patch in place.     VTE Risk Mitigation (From admission, onward)         Ordered     IP VTE LOW RISK PATIENT  Once      02/24/20 1932     Place sequential compression device  Until discontinued      02/24/20 1932              Amee Matias MD  Cardiology   [facility]

## 2020-02-25 NOTE — ASSESSMENT & PLAN NOTE
Patient use to be on lisinopril but she says her doctor told her to stop taking it.   - Continue to monitor BP

## 2020-02-25 NOTE — SUBJECTIVE & OBJECTIVE
Past Medical History:   Diagnosis Date    Anxiety     Diabetes mellitus     GERD (gastroesophageal reflux disease)     Hyperlipidemia     Hypertension        Past Surgical History:   Procedure Laterality Date    TUBAL LIGATION         Review of patient's allergies indicates:  No Known Allergies    No current facility-administered medications on file prior to encounter.      Current Outpatient Medications on File Prior to Encounter   Medication Sig    amitriptyline (ELAVIL) 25 MG tablet Take 25 mg by mouth every evening.     atenolol (TENORMIN) 50 MG tablet Take 50 mg by mouth once daily.     atorvastatin (LIPITOR) 20 MG tablet Take 20 mg by mouth once daily.     estradiol (ESTRACE) 1 MG tablet Take 1 tablet (1 mg total) by mouth once daily.    lisinopril 10 MG tablet Take 10 mg by mouth once daily.     medroxyPROGESTERone (PROVERA) 10 MG tablet Take 1 tablet (10 mg total) by mouth once daily.    meloxicam (MOBIC) 15 MG tablet Take 15 mg by mouth once daily.     metFORMIN (GLUCOPHAGE-XR) 500 MG 24 hr tablet Take 2 tablets by mouth once daily.     Family History     Problem Relation (Age of Onset)    Breast cancer Maternal Aunt    Cancer Mother        Tobacco Use    Smoking status: Current Every Day Smoker     Types: Vaping with nicotine    Smokeless tobacco: Never Used   Substance and Sexual Activity    Alcohol use: Yes     Comment: seldom    Drug use: No    Sexual activity: Yes     Partners: Male     Birth control/protection: See Surgical Hx     Comment: single     Review of Systems   Constitution: Negative for chills and fever.   Cardiovascular: Positive for palpitations. Negative for chest pain, dyspnea on exertion and syncope.   Respiratory: Negative for cough and sleep disturbances due to breathing.    Musculoskeletal: Negative for back pain.   Gastrointestinal: Negative for abdominal pain, nausea and vomiting.   Neurological: Positive for light-headedness. Negative for dizziness and focal  weakness.   Psychiatric/Behavioral: Negative for altered mental status.     Objective:     Vital Signs (Most Recent):  Temp: 97.8 °F (36.6 °C) (02/25/20 1140)  Pulse: 85 (02/25/20 1400)  Resp: 18 (02/25/20 1140)  BP: (!) 158/89 (02/25/20 1140)  SpO2: 96 % (02/25/20 1140) Vital Signs (24h Range):  Temp:  [97.6 °F (36.4 °C)-98.2 °F (36.8 °C)] 97.8 °F (36.6 °C)  Pulse:  [] 85  Resp:  [16-18] 18  SpO2:  [94 %-97 %] 96 %  BP: (131-181)/(67-89) 158/89       Weight: 96.5 kg (212 lb 11.9 oz)  Body mass index is 37.69 kg/m².    SpO2: 96 %  O2 Device (Oxygen Therapy): room air    Physical Exam   Constitutional: She is oriented to person, place, and time. She appears well-developed and well-nourished. No distress.   obese   HENT:   Head: Normocephalic and atraumatic.   Eyes: Conjunctivae and EOM are normal.   Neck: Normal range of motion. No tracheal deviation present.   Cardiovascular: Normal rate, regular rhythm and normal heart sounds.   No murmur heard.  Pulmonary/Chest: Effort normal and breath sounds normal. No respiratory distress. She has no wheezes.   Abdominal: Soft. Bowel sounds are normal. She exhibits no distension. There is no tenderness.   Musculoskeletal: Normal range of motion. She exhibits no edema.   Neurological: She is alert and oriented to person, place, and time.   Skin: She is not diaphoretic.       Significant Labs: All pertinent lab results from the last 24 hours have been reviewed.    Significant Imaging: All pertinent images from the last 24h have been reviewed.

## 2020-02-25 NOTE — ASSESSMENT & PLAN NOTE
HR ranging 30-60 bpm upon admission but found to be in A.fib with RVR the morning after admission. HR to 165 after walking up and down hallway.  - EP consulted for possible pacemaker, appreciate assistance  - Nuclear stress test ordered as patient is smoker, diabetic, HTN  - will continue tele monitoring   - pacer pads in case of emergency

## 2020-02-25 NOTE — ASSESSMENT & PLAN NOTE
- Patient admitted with sinus janie with HR as low as 39, afib RVR this AM and then NSR with -105 consistent with tachy-janie syndrome  - She reports dizziness, however, unclear if this is a results of orthostasis more than her arrhythmias  - [id number] is 3, recommend anticoagulation with apixaban  - Needs to be on beta blocker to control afib RVR episodes, however, limited with episodes of sinus bradycardia. Was on atenolol at home but holding it here. She also received Benadrayl IV at OSH that maight be contributing to her bradycardia.  - Continue to monitor off betablocker. If she has further episodes of bradycardia, will likely need PPM to tolerate beta blocker.   - Follow echocardiogram

## 2020-02-25 NOTE — HOSPITAL COURSE
"Patient was admitted to CCU for asymptomatic bradycardia and found to be in Afib with RVR. Likely with tachy-janie syndrome. She was started on therapeutic Lovenox as she was not on any prior anticoagulation. EP consulted for possible pacemaker placement and recommend no pacemaker at this time and instead to trial metoprolol 25mg BID as well as initiation of 30 day event monitor and DOAC (Apixiban) therapy. 2D ECHO and nuclear stress test ordered as patient has many risk factors (DMII, smoker, HTN) for CAD. Studies were unremarkable. Will need Allergy appointment on discharge given her red, itchy skin that she said has been bothering her for "years". Switched Lisinopirl to Amlodipine as a precaution in case ACE inhibitor was cause of itching. If noted to have albuminuria in future, can re-start ACE inhibitor as outpatient.    "

## 2020-02-25 NOTE — ASSESSMENT & PLAN NOTE
- HR ranging 30-60 bpm at this time; asymptomatic & hemodynamically stable at this time. Heart rate does respond appropriately with activity  - will continue tele monitoring   - pacer pads in case of emergency

## 2020-02-25 NOTE — HPI
"49 y.o. F with T2DM, HTN,  And HLD. Transferred from OSH for further evaluation of braydcardia. 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 She presented to the ER, where she was observed overnight and discharged the next day. Daughter reports that after went home, patient returned to the ER because symptoms had not resolved. At this point, they were given IV benadryl, steroids and epinepherine, which did improve her symptoms.     Pt was noted to incidentally develop bradycardia overnight on 2/23/20. She was asymptomatic and normal BP at that time. This morning she went intro afib with RVR and then around 11:30 am converted to NSR in 100-105. She has no known cardiac disease or arrhythmias. She does reports feeling dizzy but usually associated with rapid changes of position. Had a presyncopal episode about 2 years ago. No recent syncope. No chest pain. Reports episodes of "heart racing" that she associates to anxiety.   "

## 2020-02-25 NOTE — SUBJECTIVE & OBJECTIVE
"Interval History: Patient reports feeling SOB on exertion and dizziness. She also says she has been dealing with "hives" all over body for years. After patient walked up and down hallway, HR went up to 165.    Review of Systems   Constitution: Negative for chills and fever.   Eyes: Negative for double vision and pain.   Cardiovascular: Negative for chest pain, dyspnea on exertion, irregular heartbeat, near-syncope and syncope.   Respiratory: Positive for shortness of breath (on exertion). Negative for cough.    Skin: Positive for itching. Negative for color change.   Musculoskeletal: Negative for back pain and falls.   Gastrointestinal: Negative for abdominal pain and nausea.   Neurological: Negative for headaches and weakness.   Psychiatric/Behavioral: Negative for altered mental status and hallucinations.     Objective:     Vital Signs (Most Recent):  Temp: 97.8 °F (36.6 °C) (02/25/20 1140)  Pulse: 85 (02/25/20 1400)  Resp: 18 (02/25/20 1140)  BP: (!) 158/89 (02/25/20 1140)  SpO2: 96 % (02/25/20 1140) Vital Signs (24h Range):  Temp:  [97.6 °F (36.4 °C)-98.2 °F (36.8 °C)] 97.8 °F (36.6 °C)  Pulse:  [] 85  Resp:  [16-18] 18  SpO2:  [94 %-97 %] 96 %  BP: (131-181)/(67-89) 158/89     Weight: 96.5 kg (212 lb 11.9 oz)  Body mass index is 37.69 kg/m².     SpO2: 96 %  O2 Device (Oxygen Therapy): room air      Intake/Output Summary (Last 24 hours) at 2/25/2020 1418  Last data filed at 2/25/2020 0500  Gross per 24 hour   Intake 240 ml   Output --   Net 240 ml       Lines/Drains/Airways     None                 Physical Exam   Constitutional: She is oriented to person, place, and time. She appears well-developed and well-nourished.   HENT:   Head: Normocephalic and atraumatic.   Eyes: Pupils are equal, round, and reactive to light.   Neck: No JVD present.   Cardiovascular: An irregularly irregular rhythm present.   No murmur heard.  Pulmonary/Chest: Effort normal and breath sounds normal. No respiratory distress. "   Abdominal: Soft. Bowel sounds are normal. She exhibits no distension. There is no tenderness.   Musculoskeletal: She exhibits no edema.   Neurological: She is alert and oriented to person, place, and time.   Skin: Skin is warm and dry.   Hive-appearing lesions on arms and legs   Psychiatric: Her behavior is normal. Judgment and thought content normal.   Nursing note and vitals reviewed.      Significant Labs:   CMP   Recent Labs   Lab 02/24/20 2005 02/25/20 0422    138   K 4.5 4.0    100   CO2 19* 27   * 383*   BUN 28* 24*   CREATININE 1.1 1.0   CALCIUM 9.5 10.2   ANIONGAP 13 11   ESTGFRAFRICA >60.0 >60.0   EGFRNONAA 58.3* >60.0    and CBC   Recent Labs   Lab 02/24/20 2005 02/25/20 0422   WBC 7.49 7.36   HGB 12.3 12.9   HCT 37.8 39.5    190       Significant Imaging: EKG: A.fib with RVR

## 2020-02-25 NOTE — PLAN OF CARE
Plan of care discussed with patient. Patient is free of fall/trauma/injury. Denies CP, SOB, or pain/discomfort. Pt is ACHS; BG maintained per order. EKG completed; A. fib noted. All questions addressed. Will continue to monitor

## 2020-02-25 NOTE — SUBJECTIVE & OBJECTIVE
Past Medical History:   Diagnosis Date    Anxiety     Diabetes mellitus     GERD (gastroesophageal reflux disease)     Hyperlipidemia     Hypertension        Past Surgical History:   Procedure Laterality Date    TUBAL LIGATION         Review of patient's allergies indicates:  No Known Allergies    No current facility-administered medications on file prior to encounter.      Current Outpatient Medications on File Prior to Encounter   Medication Sig    amitriptyline (ELAVIL) 25 MG tablet Take 25 mg by mouth every evening.     atenolol (TENORMIN) 50 MG tablet Take 50 mg by mouth once daily.     atorvastatin (LIPITOR) 20 MG tablet Take 20 mg by mouth once daily.     estradiol (ESTRACE) 1 MG tablet Take 1 tablet (1 mg total) by mouth once daily.    lisinopril 10 MG tablet Take 10 mg by mouth once daily.     medroxyPROGESTERone (PROVERA) 10 MG tablet Take 1 tablet (10 mg total) by mouth once daily.    meloxicam (MOBIC) 15 MG tablet Take 15 mg by mouth once daily.     metFORMIN (GLUCOPHAGE-XR) 500 MG 24 hr tablet Take 2 tablets by mouth once daily.     Family History     Problem Relation (Age of Onset)    Breast cancer Maternal Aunt    Cancer Mother        Tobacco Use    Smoking status: Current Every Day Smoker     Types: Vaping with nicotine    Smokeless tobacco: Never Used   Substance and Sexual Activity    Alcohol use: Yes     Comment: seldom    Drug use: No    Sexual activity: Yes     Partners: Male     Birth control/protection: See Surgical Hx     Comment: single     Review of Systems   Constitution: Negative for chills and fever.   Cardiovascular: Negative for chest pain, dyspnea on exertion, irregular heartbeat, near-syncope and syncope.   Respiratory: Negative for shortness of breath.    Gastrointestinal: Negative for nausea.   Neurological: Negative for headaches and weakness.   Psychiatric/Behavioral: Negative for altered mental status.     Objective:     Vital Signs (Most Recent):  Temp: 97.8  °F (36.6 °C) (02/24/20 1951)  Pulse: 65 (02/24/20 1951)  Resp: 18 (02/24/20 1951)  BP: (!) 152/74 (02/24/20 1951)  SpO2: 96 % (02/24/20 1951) Vital Signs (24h Range):  Temp:  [97.8 °F (36.6 °C)-98.2 °F (36.8 °C)] 97.8 °F (36.6 °C)  Pulse:  [37-65] 65  Resp:  [16-18] 18  SpO2:  [93 %-98 %] 96 %  BP: (152-181)/(74-81) 152/74        Body mass index is 41.66 kg/m².    SpO2: 96 %  O2 Device (Oxygen Therapy): room air    No intake or output data in the 24 hours ending 02/24/20 2304    Lines/Drains/Airways     None                 Physical Exam   Constitutional: She is oriented to person, place, and time. She appears well-developed and well-nourished.   HENT:   Head: Normocephalic and atraumatic.   Eyes: Pupils are equal, round, and reactive to light.   Neck: No JVD present.   Cardiovascular: Normal rate and regular rhythm.   No murmur heard.  Pulmonary/Chest: Effort normal and breath sounds normal. No respiratory distress.   Abdominal: Soft. Bowel sounds are normal. She exhibits no distension. There is no tenderness.   Musculoskeletal: She exhibits no edema.   Neurological: She is alert and oriented to person, place, and time.   Skin: Skin is warm and dry. No erythema.   Psychiatric: Her behavior is normal. Judgment and thought content normal.   Vitals reviewed.    Significant Labs:   CMP   Recent Labs   Lab 02/24/20 2005      K 4.5      CO2 19*   *   BUN 28*   CREATININE 1.1   CALCIUM 9.5   ANIONGAP 13   ESTGFRAFRICA >60.0   EGFRNONAA 58.3*    and CBC   Recent Labs   Lab 02/24/20 2005   WBC 7.49   HGB 12.3   HCT 37.8        Significant Imaging: All imaging in the last 24 hours reviewed

## 2020-02-25 NOTE — ASSESSMENT & PLAN NOTE
HR ranging 30-60 bpm upon admission but found to be in A.fib with RVR the morning after admission. HR to 165 after walking up and down hallway.  - EP consulted for possible pacemaker, appreciate assistance  - Therapeutic lovenox

## 2020-02-25 NOTE — HPI
Laura Lee is a 49 y.o. F with T2DM, HTN, who was transferred from [facility] for further evaluation of braydcardia. 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 She presented to the ER, where she was observed overnight and discharged the next day. Daughter reports that after went home, patient returned to the ER because symptoms had not resolved. At this point, they were given IV benadryl, steroids and epinepherine, which did improve her symptoms.   Pt was noted to incidentally develop bradycardia overnight on 2/23/20. She denies any chest pain or shortness of breath at that time. She was transferred her for further evaluation.   Pt reports SOB after mild exertion such as working around her house, 5-10 minutes of walking. She denies any chest pain with exertion. She is unable to lie flat because of chronic back pain and reports walking every few nights with SOB. She has never seen a cardiologist or had further workup of these symptoms. She does snore and had not been tested for sleep apnea.  She is non-compliant with her medications as prescribed. She reports a variable sleeping schedule i.e. sleeping during the daytime and not having an appetite for dinner therefore not taking her evening meds.

## 2020-02-25 NOTE — CONSULTS
"[facility]  Cardiac Electrophysiology  Consult Note    Admission Date: 2/24/2020  Code Status: Full Code   Attending Provider: Ning Carey MD  Consulting Provider: Angelo Mckeon MD  Principal Problem:Symptomatic sinus bradycardia    Inpatient consult to Electrophysiology  Consult performed by: Angelo Mckeon MD  Consult ordered by: Pooja Rondon MD        Subjective:     Chief Complaint:  afib and sinus bradycardia    HPI:   49 y.o. F with T2DM, HTN,  And HLD. Transferred from OSH for further evaluation of braydcardia. 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 She presented to the ER, where she was observed overnight and discharged the next day. Daughter reports that after went home, patient returned to the ER because symptoms had not resolved. At this point, they were given IV benadryl, steroids and epinepherine, which did improve her symptoms.     Pt was noted to incidentally develop bradycardia overnight on 2/23/20. She was asymptomatic and normal BP at that time. This morning she went intro afib with RVR and then around 11:30 am converted to NSR in 100-105. She has no known cardiac disease or arrhythmias. She does reports feeling dizzy but usually associated with rapid changes of position. Had a presyncopal episode about 2 years ago. No recent syncope. No chest pain. Reports episodes of "heart racing" that she associates to anxiety.     Past Medical History:   Diagnosis Date    Anxiety     Diabetes mellitus     GERD (gastroesophageal reflux disease)     Hyperlipidemia     Hypertension        Past Surgical History:   Procedure Laterality Date    TUBAL LIGATION         Review of patient's allergies indicates:  No Known Allergies    No current facility-administered medications on file prior to encounter.      Current Outpatient Medications on File Prior to " Encounter   Medication Sig    amitriptyline (ELAVIL) 25 MG tablet Take 25 mg by mouth every evening.     atenolol (TENORMIN) 50 MG tablet Take 50 mg by mouth once daily.     atorvastatin (LIPITOR) 20 MG tablet Take 20 mg by mouth once daily.     estradiol (ESTRACE) 1 MG tablet Take 1 tablet (1 mg total) by mouth once daily.    lisinopril 10 MG tablet Take 10 mg by mouth once daily.     medroxyPROGESTERone (PROVERA) 10 MG tablet Take 1 tablet (10 mg total) by mouth once daily.    meloxicam (MOBIC) 15 MG tablet Take 15 mg by mouth once daily.     metFORMIN (GLUCOPHAGE-XR) 500 MG 24 hr tablet Take 2 tablets by mouth once daily.     Family History     Problem Relation (Age of Onset)    Breast cancer Maternal Aunt    Cancer Mother        Tobacco Use    Smoking status: Current Every Day Smoker     Types: Vaping with nicotine    Smokeless tobacco: Never Used   Substance and Sexual Activity    Alcohol use: Yes     Comment: seldom    Drug use: No    Sexual activity: Yes     Partners: Male     Birth control/protection: See Surgical Hx     Comment: single     Review of Systems   Constitution: Negative for chills and fever.   Cardiovascular: Positive for palpitations. Negative for chest pain, dyspnea on exertion and syncope.   Respiratory: Negative for cough and sleep disturbances due to breathing.    Musculoskeletal: Negative for back pain.   Gastrointestinal: Negative for abdominal pain, nausea and vomiting.   Neurological: Positive for light-headedness. Negative for dizziness and focal weakness.   Psychiatric/Behavioral: Negative for altered mental status.     Objective:     Vital Signs (Most Recent):  Temp: 97.8 °F (36.6 °C) (02/25/20 1140)  Pulse: 85 (02/25/20 1400)  Resp: 18 (02/25/20 1140)  BP: (!) 158/89 (02/25/20 1140)  SpO2: 96 % (02/25/20 1140) Vital Signs (24h Range):  Temp:  [97.6 °F (36.4 °C)-98.2 °F (36.8 °C)] 97.8 °F (36.6 °C)  Pulse:  [] 85  Resp:  [16-18] 18  SpO2:  [94 %-97 %] 96 %  BP:  (131-181)/(67-89) 158/89       Weight: 96.5 kg (212 lb 11.9 oz)  Body mass index is 37.69 kg/m².    SpO2: 96 %  O2 Device (Oxygen Therapy): room air    Physical Exam   Constitutional: She is oriented to person, place, and time. She appears well-developed and well-nourished. No distress.   obese   HENT:   Head: Normocephalic and atraumatic.   Eyes: Conjunctivae and EOM are normal.   Neck: Normal range of motion. No tracheal deviation present.   Cardiovascular: Normal rate, regular rhythm and normal heart sounds.   No murmur heard.  Pulmonary/Chest: Effort normal and breath sounds normal. No respiratory distress. She has no wheezes.   Abdominal: Soft. Bowel sounds are normal. She exhibits no distension. There is no tenderness.   Musculoskeletal: Normal range of motion. She exhibits no edema.   Neurological: She is alert and oriented to person, place, and time.   Skin: She is not diaphoretic.       Significant Labs: All pertinent lab results from the last 24 hours have been reviewed.    Significant Imaging: All pertinent images from the last 24h have been reviewed.                Assessment and Plan:     Atrial fibrillation with RVR  - Patient admitted with asymptomatic sinus janie with HR as low as 39, afib RVR this AM and then NSR with -105  - She reports dizziness, however, unclear if this is a results of orthostasis or due to atrial fibrillation  - Unable to determine from telemetry if she had a conversion pause due to artifact  - [id number] is 3, continue anticoagulation with enoxaparin for now, can switch to apixaban in the next few days  - Overall we need more data to determine if she has tachy-janie syndrome. Continue to monitor on telemetry   - Follow echocardiogram and stress test        Thank you for your consult. I will follow-up with patient. Please contact us if you have any additional questions.    Angelo Mckeon MD  Cardiac Electrophysiology  [facility]

## 2020-02-26 ENCOUNTER — TELEPHONE (OUTPATIENT)
Dept: ELECTROPHYSIOLOGY | Facility: CLINIC | Age: 50
End: 2020-02-26

## 2020-02-26 ENCOUNTER — CLINICAL SUPPORT (OUTPATIENT)
Dept: CARDIOLOGY | Facility: HOSPITAL | Age: 50
End: 2020-02-26
Attending: INTERNAL MEDICINE
Payer: MEDICAID

## 2020-02-26 VITALS
BODY MASS INDEX: 37.56 KG/M2 | TEMPERATURE: 98 F | HEART RATE: 60 BPM | WEIGHT: 212 LBS | RESPIRATION RATE: 18 BRPM | HEIGHT: 63 IN | DIASTOLIC BLOOD PRESSURE: 88 MMHG | SYSTOLIC BLOOD PRESSURE: 152 MMHG | OXYGEN SATURATION: 97 %

## 2020-02-26 DIAGNOSIS — I49.8 OTHER SPECIFIED CARDIAC ARRHYTHMIAS: Primary | ICD-10-CM

## 2020-02-26 LAB
ANION GAP SERPL CALC-SCNC: 9 MMOL/L (ref 8–16)
ASCENDING AORTA: 3.37 CM
BASOPHILS # BLD AUTO: 0.07 K/UL (ref 0–0.2)
BASOPHILS NFR BLD: 1.3 % (ref 0–1.9)
BSA FOR ECHO PROCEDURE: 2.07 M2
BUN SERPL-MCNC: 19 MG/DL (ref 6–20)
CALCIUM SERPL-MCNC: 8.9 MG/DL (ref 8.7–10.5)
CHLORIDE SERPL-SCNC: 102 MMOL/L (ref 95–110)
CO2 SERPL-SCNC: 27 MMOL/L (ref 23–29)
CREAT SERPL-MCNC: 0.8 MG/DL (ref 0.5–1.4)
CV ECHO LV RWT: 0.3 CM
CV STRESS BASE HR: 66 BPM
DIASTOLIC BLOOD PRESSURE: 80 MMHG
DIFFERENTIAL METHOD: ABNORMAL
DOP CALC LVOT AREA: 3.1 CM2
DOP CALC LVOT DIAMETER: 1.99 CM
DOP CALC LVOT PEAK VEL: 0.84 M/S
DOP CALC LVOT STROKE VOLUME: 55.52 CM3
DOP CALCLVOT PEAK VEL VTI: 17.86 CM
E WAVE DECELERATION TIME: 325.52 MSEC
E/A RATIO: 1
E/E' RATIO: 8.88 M/S
ECHO LV POSTERIOR WALL: 0.7 CM (ref 0.6–1.1)
EOSINOPHIL # BLD AUTO: 0.1 K/UL (ref 0–0.5)
EOSINOPHIL NFR BLD: 2.5 % (ref 0–8)
ERYTHROCYTE [DISTWIDTH] IN BLOOD BY AUTOMATED COUNT: 12.1 % (ref 11.5–14.5)
EST. GFR  (AFRICAN AMERICAN): >60 ML/MIN/1.73 M^2
EST. GFR  (NON AFRICAN AMERICAN): >60 ML/MIN/1.73 M^2
ESTIMATED AVG GLUCOSE: 263 MG/DL (ref 68–131)
FRACTIONAL SHORTENING: 36 % (ref 28–44)
GLUCOSE SERPL-MCNC: 238 MG/DL (ref 70–110)
HBA1C MFR BLD HPLC: 10.8 % (ref 4–5.6)
HCT VFR BLD AUTO: 41.3 % (ref 37–48.5)
HGB BLD-MCNC: 13.6 G/DL (ref 12–16)
IMM GRANULOCYTES # BLD AUTO: 0.07 K/UL (ref 0–0.04)
IMM GRANULOCYTES NFR BLD AUTO: 1.3 % (ref 0–0.5)
INTERVENTRICULAR SEPTUM: 0.8 CM (ref 0.6–1.1)
LA MAJOR: 5.2 CM
LA MINOR: 4.9 CM
LA WIDTH: 3.4 CM
LEFT ATRIUM SIZE: 3.73 CM
LEFT ATRIUM VOLUME INDEX: 27.4 ML/M2
LEFT ATRIUM VOLUME: 54.39 CM3
LEFT INTERNAL DIMENSION IN SYSTOLE: 3 CM (ref 2.1–4)
LEFT VENTRICLE DIASTOLIC VOLUME INDEX: 28.02 ML/M2
LEFT VENTRICLE DIASTOLIC VOLUME: 55.54 ML
LEFT VENTRICLE MASS INDEX: 57 G/M2
LEFT VENTRICLE SYSTOLIC VOLUME INDEX: 14.1 ML/M2
LEFT VENTRICLE SYSTOLIC VOLUME: 27.93 ML
LEFT VENTRICULAR INTERNAL DIMENSION IN DIASTOLE: 4.7 CM (ref 3.5–6)
LEFT VENTRICULAR MASS: 112.51 G
LV LATERAL E/E' RATIO: 7.89 M/S
LV SEPTAL E/E' RATIO: 10.14 M/S
LYMPHOCYTES # BLD AUTO: 2.5 K/UL (ref 1–4.8)
LYMPHOCYTES NFR BLD: 46.8 % (ref 18–48)
MAGNESIUM SERPL-MCNC: 1.3 MG/DL (ref 1.6–2.6)
MCH RBC QN AUTO: 31.2 PG (ref 27–31)
MCHC RBC AUTO-ENTMCNC: 32.9 G/DL (ref 32–36)
MCV RBC AUTO: 95 FL (ref 82–98)
MONOCYTES # BLD AUTO: 0.3 K/UL (ref 0.3–1)
MONOCYTES NFR BLD: 5.2 % (ref 4–15)
MV PEAK A VEL: 0.71 M/S
MV PEAK E VEL: 0.71 M/S
NEUTROPHILS # BLD AUTO: 2.2 K/UL (ref 1.8–7.7)
NEUTROPHILS NFR BLD: 42.9 % (ref 38–73)
NRBC BLD-RTO: 1 /100 WBC
OHS CV CPX 1 MINUTE RECOVERY HEART RATE: 123 BPM
OHS CV CPX 85 PERCENT MAX PREDICTED HEART RATE MALE: 137
OHS CV CPX MAX PREDICTED HEART RATE: 161
OHS CV CPX PATIENT IS FEMALE: 1
OHS CV CPX PATIENT IS MALE: 0
OHS CV CPX PEAK DIASTOLIC BLOOD PRESSURE: 95 MMHG
OHS CV CPX PEAK HEAR RATE: 141 BPM
OHS CV CPX PEAK RATE PRESSURE PRODUCT: NORMAL
OHS CV CPX PEAK SYSTOLIC BLOOD PRESSURE: 155 MMHG
OHS CV CPX PERCENT MAX PREDICTED HEART RATE ACHIEVED: 88
OHS CV CPX RATE PRESSURE PRODUCT PRESENTING: NORMAL
PLATELET # BLD AUTO: 165 K/UL (ref 150–350)
PMV BLD AUTO: 10.6 FL (ref 9.2–12.9)
POCT GLUCOSE: 201 MG/DL (ref 70–110)
POCT GLUCOSE: 345 MG/DL (ref 70–110)
POTASSIUM SERPL-SCNC: 4 MMOL/L (ref 3.5–5.1)
RA MAJOR: 4.65 CM
RA PRESSURE: 3 MMHG
RA WIDTH: 3.29 CM
RBC # BLD AUTO: 4.36 M/UL (ref 4–5.4)
RIGHT VENTRICULAR END-DIASTOLIC DIMENSION: 3.07 CM
SINUS: 2.85 CM
SODIUM SERPL-SCNC: 138 MMOL/L (ref 136–145)
STJ: 3.17 CM
STRESS ECHO TARGET HR: 144 BPM
SYSTOLIC BLOOD PRESSURE: 158 MMHG
TDI LATERAL: 0.09 M/S
TDI SEPTAL: 0.07 M/S
TDI: 0.08 M/S
TRICUSPID ANNULAR PLANE SYSTOLIC EXCURSION: 1.98 CM
WBC # BLD AUTO: 5.23 K/UL (ref 3.9–12.7)

## 2020-02-26 PROCEDURE — 25000003 PHARM REV CODE 250: Performed by: STUDENT IN AN ORGANIZED HEALTH CARE EDUCATION/TRAINING PROGRAM

## 2020-02-26 PROCEDURE — 83735 ASSAY OF MAGNESIUM: CPT

## 2020-02-26 PROCEDURE — S4991 NICOTINE PATCH NONLEGEND: HCPCS | Performed by: STUDENT IN AN ORGANIZED HEALTH CARE EDUCATION/TRAINING PROGRAM

## 2020-02-26 PROCEDURE — 85025 COMPLETE CBC W/AUTO DIFF WBC: CPT

## 2020-02-26 PROCEDURE — G0378 HOSPITAL OBSERVATION PER HR: HCPCS

## 2020-02-26 PROCEDURE — 99224 PR SUBSEQUENT OBSERVATION CARE,LEVEL I: ICD-10-PCS | Mod: ,,, | Performed by: INTERNAL MEDICINE

## 2020-02-26 PROCEDURE — 36415 COLL VENOUS BLD VENIPUNCTURE: CPT

## 2020-02-26 PROCEDURE — 93271 ECG/MONITORING AND ANALYSIS: CPT

## 2020-02-26 PROCEDURE — 99224 PR SUBSEQUENT OBSERVATION CARE,LEVEL I: CPT | Mod: ,,, | Performed by: INTERNAL MEDICINE

## 2020-02-26 PROCEDURE — 96375 TX/PRO/DX INJ NEW DRUG ADDON: CPT | Performed by: INTERNAL MEDICINE

## 2020-02-26 PROCEDURE — 80048 BASIC METABOLIC PNL TOTAL CA: CPT

## 2020-02-26 PROCEDURE — 93272 ECG/REVIEW INTERPRET ONLY: CPT | Mod: ,,, | Performed by: INTERNAL MEDICINE

## 2020-02-26 PROCEDURE — 96374 THER/PROPH/DIAG INJ IV PUSH: CPT | Performed by: INTERNAL MEDICINE

## 2020-02-26 PROCEDURE — 63600175 PHARM REV CODE 636 W HCPCS: Performed by: STUDENT IN AN ORGANIZED HEALTH CARE EDUCATION/TRAINING PROGRAM

## 2020-02-26 PROCEDURE — 63600175 PHARM REV CODE 636 W HCPCS: Performed by: INTERNAL MEDICINE

## 2020-02-26 PROCEDURE — 25000003 PHARM REV CODE 250: Performed by: INTERNAL MEDICINE

## 2020-02-26 PROCEDURE — 93272 CARDIAC EVENT MONITOR (CUPID ONLY): ICD-10-PCS | Mod: ,,, | Performed by: INTERNAL MEDICINE

## 2020-02-26 RX ORDER — METOPROLOL TARTRATE 25 MG/1
25 TABLET, FILM COATED ORAL 2 TIMES DAILY
Qty: 60 TABLET | Refills: 3 | Status: SHIPPED | OUTPATIENT
Start: 2020-02-26 | End: 2022-06-01

## 2020-02-26 RX ORDER — REGADENOSON 0.08 MG/ML
0.4 INJECTION, SOLUTION INTRAVENOUS
Status: COMPLETED | OUTPATIENT
Start: 2020-02-26 | End: 2020-02-26

## 2020-02-26 RX ORDER — AMINOPHYLLINE 25 MG/ML
75 INJECTION, SOLUTION INTRAVENOUS
Status: COMPLETED | OUTPATIENT
Start: 2020-02-26 | End: 2020-02-26

## 2020-02-26 RX ORDER — AMINOPHYLLINE 25 MG/ML
75 INJECTION, SOLUTION INTRAVENOUS
Status: DISCONTINUED | OUTPATIENT
Start: 2020-02-26 | End: 2020-02-26

## 2020-02-26 RX ORDER — AMLODIPINE BESYLATE 5 MG/1
5 TABLET ORAL DAILY
Qty: 30 TABLET | Refills: 3 | Status: SHIPPED | OUTPATIENT
Start: 2020-02-27 | End: 2022-06-01

## 2020-02-26 RX ORDER — MAGNESIUM SULFATE HEPTAHYDRATE 40 MG/ML
2 INJECTION, SOLUTION INTRAVENOUS ONCE
Status: COMPLETED | OUTPATIENT
Start: 2020-02-26 | End: 2020-02-26

## 2020-02-26 RX ORDER — METOPROLOL TARTRATE 25 MG/1
25 TABLET, FILM COATED ORAL 2 TIMES DAILY
Status: DISCONTINUED | OUTPATIENT
Start: 2020-02-26 | End: 2020-02-26 | Stop reason: HOSPADM

## 2020-02-26 RX ORDER — MAGNESIUM SULFATE HEPTAHYDRATE 40 MG/ML
2 INJECTION, SOLUTION INTRAVENOUS ONCE
Status: DISCONTINUED | OUTPATIENT
Start: 2020-02-26 | End: 2020-02-26

## 2020-02-26 RX ADMIN — AMINOPHYLLINE 75 MG: 25 INJECTION, SOLUTION INTRAVENOUS at 11:02

## 2020-02-26 RX ADMIN — APIXABAN 5 MG: 5 TABLET, FILM COATED ORAL at 04:02

## 2020-02-26 RX ADMIN — NICOTINE 1 PATCH: 21 PATCH, EXTENDED RELEASE TRANSDERMAL at 08:02

## 2020-02-26 RX ADMIN — INSULIN ASPART 4 UNITS: 100 INJECTION, SOLUTION INTRAVENOUS; SUBCUTANEOUS at 08:02

## 2020-02-26 RX ADMIN — MAGNESIUM SULFATE IN WATER 2 G: 40 INJECTION, SOLUTION INTRAVENOUS at 02:02

## 2020-02-26 RX ADMIN — REGADENOSON 0.4 MG: 0.08 INJECTION, SOLUTION INTRAVENOUS at 11:02

## 2020-02-26 RX ADMIN — AMLODIPINE BESYLATE 5 MG: 5 TABLET ORAL at 08:02

## 2020-02-26 NOTE — PROGRESS NOTES
RN contacted MD Mckeon regarding pt continued complaint of itching and neck pain.  RN also informed MD that pt took own benadryl and took 50mg instead of waiting for day team to administer benadryl.  Pt states that the itching has not subsided and is complaining of neck pain rated 7/10.  RN already administered pt's PRN tylenol with no relief.  MD to place additional pain medication.  Will continue to monitor.

## 2020-02-26 NOTE — PLAN OF CARE
Patient remained free of falls, trauma, injury, and skin breakdown. VSS; pt complaint of neck pain, itching, and insomnia- PRN medications administered and MD notified.  Glucose monitored; PRN insulin administered.  Pacer pads in place and tele closely monitored. Fall precautions maintained.  Plan of care reviewed; patient verbalized understanding.  All questions and concerns addressed; will continue to monitor.

## 2020-02-26 NOTE — ASSESSMENT & PLAN NOTE
HR ranging 30-60 bpm upon admission but found to be in A.fib with RVR the morning after admission. HR to 165 after walking up and down hallway.  - EP consulted for possible pacemaker, appreciate assistance: no PPM indicated at this time, initiation of DOAC therapy and 30 day event monitor   - Nuclear stress test ordered as patient is smoker, diabetic, HTN  - Follow up 2D ECHO  - Will continue tele monitoring

## 2020-02-26 NOTE — NURSING
"Patient verified by 2 identifiers and allergies reviewed.  22 g IV in place to Rt AC.  Lexiscan testing explained to patient, patient verbalized understanding, consent obtained & testing completed.  Pt C/O throat pain/ discomfort- "feels like my throat is closing", lightheadedness, SOB & HA.  No resp distress observed, pt breathing comfortably.  Aminophylline 150mg IVP given in divided doses with relief of symptoms.  IV flushed post testing.  Post study discharge instructions reviewed with patient, patient verbalized understanding.  Patient taken to Nuclear Med by escort via stretcher in stable condition for completion of pictures.  "

## 2020-02-26 NOTE — SUBJECTIVE & OBJECTIVE
Interval History: Overnight, patient stated that she had itching. This morning, she reports some SOB on exertion but denies chest pain, dizziness.     Review of Systems   Constitution: Negative for chills and fever.   Eyes: Negative for double vision and pain.   Cardiovascular: Negative for chest pain, dyspnea on exertion, irregular heartbeat, near-syncope and syncope.   Respiratory: Positive for shortness of breath (on exertion). Negative for cough.    Skin: Positive for itching. Negative for color change.   Musculoskeletal: Negative for back pain and falls.   Gastrointestinal: Negative for abdominal pain and nausea.   Neurological: Negative for headaches and weakness.   Psychiatric/Behavioral: Negative for altered mental status and hallucinations.     Objective:     Vital Signs (Most Recent):  Temp: 98.3 °F (36.8 °C) (02/26/20 0819)  Pulse: 72 (02/26/20 0819)  Resp: 18 (02/26/20 0819)  BP: (!) 152/88 (02/26/20 0819)  SpO2: 97 % (02/26/20 0819) Vital Signs (24h Range):  Temp:  [96.8 °F (36 °C)-98.3 °F (36.8 °C)] 98.3 °F (36.8 °C)  Pulse:  [] 72  Resp:  [16-18] 18  SpO2:  [96 %-98 %] 97 %  BP: (121-158)/(70-89) 152/88     Weight: 96.4 kg (212 lb 8.4 oz)  Body mass index is 37.65 kg/m².     SpO2: 97 %  O2 Device (Oxygen Therapy): room air      Intake/Output Summary (Last 24 hours) at 2/26/2020 1112  Last data filed at 2/25/2020 1955  Gross per 24 hour   Intake 300 ml   Output --   Net 300 ml       Lines/Drains/Airways     None                 Physical Exam   Constitutional: She is oriented to person, place, and time. She appears well-developed and well-nourished.   HENT:   Head: Normocephalic and atraumatic.   Eyes: Pupils are equal, round, and reactive to light.   Neck: No JVD present.   Cardiovascular: Normal rate and regular rhythm.   No murmur heard.  Pulmonary/Chest: Effort normal and breath sounds normal. No respiratory distress.   Abdominal: Soft. Bowel sounds are normal. She exhibits no distension.  There is no tenderness.   Musculoskeletal: She exhibits no edema.   Neurological: She is alert and oriented to person, place, and time.   Skin: Skin is warm and dry.   Hive-appearing lesions on arms and legs   Psychiatric: Her behavior is normal. Judgment and thought content normal.   Nursing note and vitals reviewed.      Significant Labs:   BMP:   Recent Labs   Lab 02/24/20 2005 02/25/20 0422 02/26/20 0619   * 383* 238*    138 138   K 4.5 4.0 4.0    100 102   CO2 19* 27 27   BUN 28* 24* 19   CREATININE 1.1 1.0 0.8   CALCIUM 9.5 10.2 8.9   MG  --   --  1.3*   , CBC   Recent Labs   Lab 02/24/20 2005 02/25/20 0422 02/26/20 0619   WBC 7.49 7.36 5.23   HGB 12.3 12.9 13.6   HCT 37.8 39.5 41.3    190 165    and Lipid Panel   Recent Labs   Lab 02/25/20 0422   CHOL 154   HDL 41   LDLCALC 60.2*   TRIG 264*   CHOLHDL 26.6       Significant Imaging:   Awaiting 2D ECHO and nuclear stress test

## 2020-02-26 NOTE — ASSESSMENT & PLAN NOTE
Patient use to be on lisinopril but she says her doctor told her to stop taking it.   - Started amlodipine 5mg daily on 02/26

## 2020-02-26 NOTE — ASSESSMENT & PLAN NOTE
HR ranging 30-60 bpm upon admission but found to be in A.fib with RVR the morning after admission. HR to 165 after walking up and down hallway.  - EP consulted for possible pacemaker, appreciate assistance: no PPM indicated at this time, initiation of DOAC therapy and 30 day event monitor   - Consider metoprolol 25mg BID PO  - Therapeutic lovenox, will transition to DOAC therapy  - Follow up 2D ECHO and nuclear stress test

## 2020-02-26 NOTE — TELEPHONE ENCOUNTER
Pt now scheduled   ----- Message from Everardo Kan MD sent at 2/26/2020  8:18 AM CST -----  Can I see Mrs. Lee in follow-up clinic in 6-8 weeks?

## 2020-02-26 NOTE — DISCHARGE SUMMARY
[facility]  Cardiology  Discharge Summary      Patient Name: Laura Lee  MRN: [id number]  Admission Date: 2/24/2020  Hospital Length of Stay: 2 days  Discharge Date and Time:  02/26/2020 2:48 PM  Attending Physician: Ning Carey MD    Discharging Provider: Apoorva Valdivia MD  Primary Care Physician: Les Bryant MD    HPI:   Laura Lee is a 49 y.o. F with T2DM, HTN, who was transferred from [facility] for further evaluation of braydcardia. 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 She presented to the ER, where she was observed overnight and discharged the next day. Daughter reports that after went home, patient returned to the ER because symptoms had not resolved. At this point, they were given IV benadryl, steroids and epinepherine, which did improve her symptoms.   Pt was noted to incidentally develop bradycardia overnight on 2/23/20. She denies any chest pain or shortness of breath at that time. She was transferred her for further evaluation.   Pt reports SOB after mild exertion such as working around her house, 5-10 minutes of walking. She denies any chest pain with exertion. She is unable to lie flat because of chronic back pain and reports walking every few nights with SOB. She has never seen a cardiologist or had further workup of these symptoms. She does snore and had not been tested for sleep apnea.  She is non-compliant with her medications as prescribed. She reports a variable sleeping schedule i.e. sleeping during the daytime and not having an appetite for dinner therefore not taking her evening meds.     * No surgery found *     Indwelling Lines/Drains at time of discharge:  Lines/Drains/Airways     None                 Hospital Course:  Patient was admitted to CCU for asymptomatic bradycardia and found to be in Afib with RVR. Likely with tachy-janie  "syndrome. She was started on therapeutic Lovenox as she was not on any prior anticoagulation. EP consulted for possible pacemaker placement and recommend no pacemaker at this time and instead to trial metoprolol 25mg BID as well as initiation of 30 day event monitor and DOAC (Apixiban) therapy. 2D ECHO and nuclear stress test ordered as patient has many risk factors (DMII, smoker, HTN) for CAD. Studies were unremarkable. Will need Allergy appointment on discharge given her red, itchy skin that she said has been bothering her for "years". Switched Lisinopirl to Amlodipine as a precaution in case ACE inhibitor was cause of itching. If noted to have albuminuria in future, can re-start ACE inhibitor as outpatient.      Consults:   Consults (From admission, onward)        Status Ordering Provider     Inpatient consult to Electrophysiology  Once     Provider:  (Not yet assigned)    MESERET Faulkner          Significant Diagnostic Studies: Labs:   CMP   Recent Labs   Lab 02/24/20 2005 02/25/20  0422 02/26/20  0619    138 138   K 4.5 4.0 4.0    100 102   CO2 19* 27 27   * 383* 238*   BUN 28* 24* 19   CREATININE 1.1 1.0 0.8   CALCIUM 9.5 10.2 8.9   ANIONGAP 13 11 9   ESTGFRAFRICA >60.0 >60.0 >60.0   EGFRNONAA 58.3* >60.0 >60.0   , CBC   Recent Labs   Lab 02/24/20 2005 02/25/20 0422 02/26/20  0619   WBC 7.49 7.36 5.23   HGB 12.3 12.9 13.6   HCT 37.8 39.5 41.3    190 165   , INR   Lab Results   Component Value Date    INR 1.0 02/24/2020   , Lipid Panel   Lab Results   Component Value Date    CHOL 154 02/25/2020    HDL 41 02/25/2020    LDLCALC 60.2 (L) 02/25/2020    TRIG 264 (H) 02/25/2020    CHOLHDL 26.6 02/25/2020    and A1C: No results for input(s): HGBA1C in the last 4320 hours.      Pending Diagnostic Studies:     Procedure Component Value Units Date/Time    Hemoglobin A1c [[id number]] Collected:  02/24/20 2005    Order Status:  Sent Lab Status:  In process Updated:  02/26/20 1442    " Specimen:  Blood     NM Myocardial Perfusion Spect Multi Exer [[id number]] Resulted:  02/26/20 1440    Order Status:  Sent Lab Status:  In process Updated:  02/26/20 1302        Echo 2/26/20  · Normal left ventricular systolic function. The estimated ejection fraction is 55%.  · Normal LV diastolic function.  · Normal right ventricular systolic function.  · Normal central venous pressure (3 mmHg).     Nuclear Stress Test 2/26/20    The EKG portion of this study is negative for ischemia.    The patient reported no chest pain during the stress test.    The blood pressure response to stress was normal.       Final Active Diagnoses:    Diagnosis Date Noted POA    PRINCIPAL PROBLEM:  Symptomatic sinus bradycardia [R00.1] 02/24/2020 Yes    Essential hypertension [I10] 02/25/2020 Yes    Tobacco abuse [Z72.0] 02/25/2020 Yes    Atrial fibrillation with RVR [I48.91] 02/25/2020 Yes    T2DM (type 2 diabetes mellitus) [E11.9] 02/24/2020 Yes      Problems Resolved During this Admission:     No new Assessment & Plan notes have been filed under this hospital service since the last note was generated.  Service: Cardiology      Discharged Condition: stable    Disposition: Home or Self Care    Follow Up:    Patient Instructions:   No discharge procedures on file.  Medications:  Reconciled Home Medications:      Medication List      START taking these medications    amLODIPine 5 MG tablet  Commonly known as:  NORVASC  Take 1 tablet (5 mg total) by mouth once daily.  Start taking on:  February 27, 2020     apixaban 5 mg Tab  Commonly known as:  ELIQUIS  Take 1 tablet (5 mg total) by mouth 2 (two) times daily.     metoprolol tartrate 25 MG tablet  Commonly known as:  LOPRESSOR  Take 1 tablet (25 mg total) by mouth 2 (two) times daily.        CONTINUE taking these medications    amitriptyline 25 MG tablet  Commonly known as:  ELAVIL  Take 25 mg by mouth every evening.     atorvastatin 20 MG tablet  Commonly known as:  LIPITOR  Take  20 mg by mouth once daily.     estradiol 1 MG tablet  Commonly known as:  ESTRACE  Take 1 tablet (1 mg total) by mouth once daily.     medroxyPROGESTERone 10 MG tablet  Commonly known as:  PROVERA  Take 1 tablet (10 mg total) by mouth once daily.     metFORMIN 500 MG XR 24hr tablet  Commonly known as:  GLUCOPHAGE-XR  Take 2 tablets by mouth once daily.        STOP taking these medications    atenoloL 50 MG tablet  Commonly known as:  TENORMIN     lisinopril 10 MG tablet     meloxicam 15 MG tablet  Commonly known as:  MOBIC            Time spent on the discharge of patient: 35 minutes    Apoorva Valdivia MD  Cardiology  [facility]

## 2020-02-26 NOTE — PLAN OF CARE
02/26/20 1232   Post-Acute Status   Post-Acute Authorization Other   Other Status No Post-Acute Service Needs

## 2020-02-26 NOTE — PLAN OF CARE
02/26/20 1221   Discharge Assessment   Assessment Type Discharge Planning Assessment   Confirmed/corrected address and phone number on facesheet? Yes   Assessment information obtained from? Patient;Medical Record   Expected Length of Stay (days) 3   Communicated expected length of stay with patient/caregiver yes   Prior to hospitilization cognitive status: Alert/Oriented   Prior to hospitalization functional status: Independent   Current cognitive status: Alert/Oriented   Current Functional Status: Independent   Facility Arrived From: [facility]   Lives With significant other   Able to Return to Prior Arrangements yes   Is patient able to care for self after discharge? Yes   Patient's perception of discharge disposition home or selfcare   Readmission Within the Last 30 Days no previous admission in last 30 days   Patient currently being followed by outpatient case management? No   Patient currently receives any other outside agency services? No   Equipment Currently Used at Home glucometer   Do you have any problems affording any of your prescribed medications? TBD   Is the patient taking medications as prescribed? yes   Does the patient have transportation home? Yes   Transportation Anticipated family or friend will provide   Does the patient receive services at the Coumadin Clinic? No   Discharge Plan A Home with family   DME Needed Upon Discharge  none   Patient/Family in Agreement with Plan yes   Transferred from [facility] for Bradycardia. Lives with SO and is independent in her ADLs. Plan is to DC home. No DC needs identified.

## 2020-02-26 NOTE — PROGRESS NOTES
[facility]  Cardiology  Progress Note    Patient Name: Laura Lee  MRN: [id number]  Admission Date: 2/24/2020  Hospital Length of Stay: 2 days  Code Status: Full Code   Attending Physician: Ning Carey MD   Primary Care Physician: Les Bryant MD  Expected Discharge Date:   Principal Problem:Symptomatic sinus bradycardia    Subjective:     Hospital Course:   Patient was admitted to CCU for symptomatic bradycardia and found to be in Afib with RVR. She was started on therapeutic Lovenox as she is not on any prior anticoagulation. EP consulted for possible pacemaker placement and recommend no pacemaker at this time and instead to trial metoprolol 25mg BID as well as initiation of 30 day event monitor and DOAC therapy. 2D ECHO and nuclear stress test ordered as patient has many risk factors (DMII, smoker, HTN) for CAD.     Interval History: Overnight, patient stated that she had itching. This morning, she reports some SOB on exertion but denies chest pain, dizziness.     Review of Systems   Constitution: Negative for chills and fever.   Eyes: Negative for double vision and pain.   Cardiovascular: Negative for chest pain, dyspnea on exertion, irregular heartbeat, near-syncope and syncope.   Respiratory: Positive for shortness of breath (on exertion). Negative for cough.    Skin: Positive for itching. Negative for color change.   Musculoskeletal: Negative for back pain and falls.   Gastrointestinal: Negative for abdominal pain and nausea.   Neurological: Negative for headaches and weakness.   Psychiatric/Behavioral: Negative for altered mental status and hallucinations.     Objective:     Vital Signs (Most Recent):  Temp: 98.3 °F (36.8 °C) (02/26/20 0819)  Pulse: 72 (02/26/20 0819)  Resp: 18 (02/26/20 0819)  BP: (!) 152/88 (02/26/20 0819)  SpO2: 97 % (02/26/20 0819) Vital Signs (24h Range):  Temp:  [96.8 °F (36 °C)-98.3 °F (36.8 °C)] 98.3 °F (36.8 °C)  Pulse:  [] 72  Resp:  [16-18]  18  SpO2:  [96 %-98 %] 97 %  BP: (121-158)/(70-89) 152/88     Weight: 96.4 kg (212 lb 8.4 oz)  Body mass index is 37.65 kg/m².     SpO2: 97 %  O2 Device (Oxygen Therapy): room air      Intake/Output Summary (Last 24 hours) at 2/26/2020 1112  Last data filed at 2/25/2020 1955  Gross per 24 hour   Intake 300 ml   Output --   Net 300 ml       Lines/Drains/Airways     None                 Physical Exam   Constitutional: She is oriented to person, place, and time. She appears well-developed and well-nourished.   HENT:   Head: Normocephalic and atraumatic.   Eyes: Pupils are equal, round, and reactive to light.   Neck: No JVD present.   Cardiovascular: Normal rate and regular rhythm.   No murmur heard.  Pulmonary/Chest: Effort normal and breath sounds normal. No respiratory distress.   Abdominal: Soft. Bowel sounds are normal. She exhibits no distension. There is no tenderness.   Musculoskeletal: She exhibits no edema.   Neurological: She is alert and oriented to person, place, and time.   Skin: Skin is warm and dry.   Hive-appearing lesions on arms and legs   Psychiatric: Her behavior is normal. Judgment and thought content normal.   Nursing note and vitals reviewed.      Significant Labs:   BMP:   Recent Labs   Lab 02/24/20 2005 02/25/20 0422 02/26/20  0619   * 383* 238*    138 138   K 4.5 4.0 4.0    100 102   CO2 19* 27 27   BUN 28* 24* 19   CREATININE 1.1 1.0 0.8   CALCIUM 9.5 10.2 8.9   MG  --   --  1.3*   , CBC   Recent Labs   Lab 02/24/20 2005 02/25/20 0422 02/26/20  0619   WBC 7.49 7.36 5.23   HGB 12.3 12.9 13.6   HCT 37.8 39.5 41.3    190 165    and Lipid Panel   Recent Labs   Lab 02/25/20 0422   CHOL 154   HDL 41   LDLCALC 60.2*   TRIG 264*   CHOLHDL 26.6       Significant Imaging:   Awaiting 2D ECHO and nuclear stress test    Assessment and Plan:     Brief HPI: 49F w/ HTN, DMII presents with tachy-janie syndrome     * Symptomatic sinus bradycardia  HR ranging 30-60 bpm upon  admission but found to be in A.fib with RVR the morning after admission. HR to 165 after walking up and down hallway.  - EP consulted for possible pacemaker, appreciate assistance: no PPM indicated at this time, initiation of DOAC therapy and 30 day event monitor   - Nuclear stress test ordered as patient is smoker, diabetic, HTN  - Follow up 2D ECHO  - Will continue tele monitoring       Atrial fibrillation with RVR  HR ranging 30-60 bpm upon admission but found to be in A.fib with RVR the morning after admission. HR to 165 after walking up and down hallway.  - EP consulted for possible pacemaker, appreciate assistance: no PPM indicated at this time, initiation of DOAC therapy and 30 day event monitor   - Consider metoprolol 25mg BID PO  - Therapeutic lovenox, will transition to DOAC therapy  - Follow up 2D ECHO and nuclear stress test    Tobacco abuse  Patient is an active smoker. Encouraged cessation. Nicotine patch while in hospital.    Essential hypertension  Patient use to be on lisinopril but she says her doctor told her to stop taking it.   - Started amlodipine 5mg daily on 02/26    T2DM (type 2 diabetes mellitus)  - on metformin at home  - diabetic diet  - A1c pending  - POCT AC & HS; MD SSI      Patient seen, and case discussed with staff, Dr. Carey. Attending attestation to follow.    VTE Risk Mitigation (From admission, onward)         Ordered     enoxaparin injection 100 mg  Every 12 hours      02/25/20 0937     IP VTE LOW RISK PATIENT  Once      02/24/20 1932     Place sequential compression device  Until discontinued      02/24/20 1932                Pooja Rondon MD  Cardiology  [facility]

## 2020-02-27 NOTE — PLAN OF CARE
02/27/20 0640   Final Note   Assessment Type Final Discharge Note   Anticipated Discharge Disposition Home   Hospital Follow Up  Appt(s) scheduled? Yes

## 2020-02-28 ENCOUNTER — TELEPHONE (OUTPATIENT)
Dept: CARDIOLOGY | Facility: HOSPITAL | Age: 50
End: 2020-02-28

## 2020-02-28 ENCOUNTER — ANESTHESIA EVENT (OUTPATIENT)
Dept: MEDSURG UNIT | Facility: HOSPITAL | Age: 50
DRG: 243 | End: 2020-02-28
Payer: MEDICAID

## 2020-02-28 ENCOUNTER — HOSPITAL ENCOUNTER (OUTPATIENT)
Facility: HOSPITAL | Age: 50
Discharge: HOME | DRG: 243 | End: 2020-02-29
Attending: INTERNAL MEDICINE | Admitting: INTERNAL MEDICINE
Payer: MEDICAID

## 2020-02-28 ENCOUNTER — ANESTHESIA (OUTPATIENT)
Dept: MEDSURG UNIT | Facility: HOSPITAL | Age: 50
DRG: 243 | End: 2020-02-28
Payer: MEDICAID

## 2020-02-28 DIAGNOSIS — I45.5 SINUS PAUSE: ICD-10-CM

## 2020-02-28 DIAGNOSIS — R00.1 BRADYCARDIA: ICD-10-CM

## 2020-02-28 DIAGNOSIS — R00.1 SYMPTOMATIC SINUS BRADYCARDIA: Primary | ICD-10-CM

## 2020-02-28 PROBLEM — I48.0 PAROXYSMAL A-FIB: Status: ACTIVE | Noted: 2020-02-28

## 2020-02-28 LAB
ABO + RH BLD: NORMAL
APTT BLDCRRT: 23.7 SEC (ref 21–32)
BASOPHILS # BLD AUTO: 0.04 K/UL (ref 0–0.2)
BASOPHILS NFR BLD: 0.5 % (ref 0–1.9)
BLD GP AB SCN CELLS X3 SERPL QL: NORMAL
DIFFERENTIAL METHOD: ABNORMAL
EOSINOPHIL # BLD AUTO: 0.4 K/UL (ref 0–0.5)
EOSINOPHIL NFR BLD: 4.8 % (ref 0–8)
ERYTHROCYTE [DISTWIDTH] IN BLOOD BY AUTOMATED COUNT: 12.3 % (ref 11.5–14.5)
HCT VFR BLD AUTO: 43.6 % (ref 37–48.5)
HGB BLD-MCNC: 14.9 G/DL (ref 12–16)
IMM GRANULOCYTES # BLD AUTO: 0.08 K/UL (ref 0–0.04)
IMM GRANULOCYTES NFR BLD AUTO: 1 % (ref 0–0.5)
INR PPP: 1 (ref 0.8–1.2)
LYMPHOCYTES # BLD AUTO: 2.7 K/UL (ref 1–4.8)
LYMPHOCYTES NFR BLD: 34.3 % (ref 18–48)
MCH RBC QN AUTO: 32 PG (ref 27–31)
MCHC RBC AUTO-ENTMCNC: 34.2 G/DL (ref 32–36)
MCV RBC AUTO: 94 FL (ref 82–98)
MONOCYTES # BLD AUTO: 0.5 K/UL (ref 0.3–1)
MONOCYTES NFR BLD: 6.8 % (ref 4–15)
NEUTROPHILS # BLD AUTO: 4.1 K/UL (ref 1.8–7.7)
NEUTROPHILS NFR BLD: 52.6 % (ref 38–73)
NRBC BLD-RTO: 0 /100 WBC
PLATELET # BLD AUTO: 235 K/UL (ref 150–350)
PMV BLD AUTO: 10.5 FL (ref 9.2–12.9)
POCT GLUCOSE: 129 MG/DL (ref 70–110)
POCT GLUCOSE: 164 MG/DL (ref 70–110)
POCT GLUCOSE: 352 MG/DL (ref 70–110)
PROTHROMBIN TIME: 10.4 SEC (ref 9–12.5)
RBC # BLD AUTO: 4.65 M/UL (ref 4–5.4)
WBC # BLD AUTO: 7.78 K/UL (ref 3.9–12.7)

## 2020-02-28 PROCEDURE — 85730 THROMBOPLASTIN TIME PARTIAL: CPT

## 2020-02-28 PROCEDURE — C1785 PMKR, DUAL, RATE-RESP: HCPCS | Performed by: INTERNAL MEDICINE

## 2020-02-28 PROCEDURE — 85610 PROTHROMBIN TIME: CPT

## 2020-02-28 PROCEDURE — 63600175 PHARM REV CODE 636 W HCPCS: Performed by: INTERNAL MEDICINE

## 2020-02-28 PROCEDURE — 63600175 PHARM REV CODE 636 W HCPCS: Performed by: STUDENT IN AN ORGANIZED HEALTH CARE EDUCATION/TRAINING PROGRAM

## 2020-02-28 PROCEDURE — 94761 N-INVAS EAR/PLS OXIMETRY MLT: CPT

## 2020-02-28 PROCEDURE — 25000003 PHARM REV CODE 250: Performed by: NURSE ANESTHETIST, CERTIFIED REGISTERED

## 2020-02-28 PROCEDURE — 93005 ELECTROCARDIOGRAM TRACING: CPT | Mod: 59

## 2020-02-28 PROCEDURE — 25000003 PHARM REV CODE 250: Performed by: INTERNAL MEDICINE

## 2020-02-28 PROCEDURE — 99233 PR SUBSEQUENT HOSPITAL CARE,LEVL III: ICD-10-PCS | Mod: 57,,, | Performed by: INTERNAL MEDICINE

## 2020-02-28 PROCEDURE — G0378 HOSPITAL OBSERVATION PER HR: HCPCS

## 2020-02-28 PROCEDURE — 37000008 HC ANESTHESIA 1ST 15 MINUTES: Performed by: INTERNAL MEDICINE

## 2020-02-28 PROCEDURE — D9220A PRA ANESTHESIA: ICD-10-PCS | Mod: ANES,,, | Performed by: ANESTHESIOLOGY

## 2020-02-28 PROCEDURE — D9220A PRA ANESTHESIA: ICD-10-PCS | Mod: CRNA,,, | Performed by: NURSE ANESTHETIST, CERTIFIED REGISTERED

## 2020-02-28 PROCEDURE — 33208 PR INSER HART PACER XVENOUS ATR/VENTR: ICD-10-PCS | Mod: KX,,, | Performed by: INTERNAL MEDICINE

## 2020-02-28 PROCEDURE — C1894 INTRO/SHEATH, NON-LASER: HCPCS | Performed by: INTERNAL MEDICINE

## 2020-02-28 PROCEDURE — 63600175 PHARM REV CODE 636 W HCPCS: Performed by: ANESTHESIOLOGY

## 2020-02-28 PROCEDURE — 93005 ELECTROCARDIOGRAM TRACING: CPT

## 2020-02-28 PROCEDURE — 33208 INSRT HEART PM ATRIAL & VENT: CPT | Mod: KX | Performed by: INTERNAL MEDICINE

## 2020-02-28 PROCEDURE — 36415 COLL VENOUS BLD VENIPUNCTURE: CPT

## 2020-02-28 PROCEDURE — 93010 ELECTROCARDIOGRAM REPORT: CPT | Mod: ,,, | Performed by: INTERNAL MEDICINE

## 2020-02-28 PROCEDURE — 00530 ANES PERM TRANSVNS PM INSJ: CPT | Performed by: INTERNAL MEDICINE

## 2020-02-28 PROCEDURE — 20600001 HC STEP DOWN PRIVATE ROOM

## 2020-02-28 PROCEDURE — 63600175 PHARM REV CODE 636 W HCPCS: Performed by: NURSE ANESTHETIST, CERTIFIED REGISTERED

## 2020-02-28 PROCEDURE — 25500020 PHARM REV CODE 255: Performed by: NURSE ANESTHETIST, CERTIFIED REGISTERED

## 2020-02-28 PROCEDURE — 33208 INSRT HEART PM ATRIAL & VENT: CPT | Mod: KX,,, | Performed by: INTERNAL MEDICINE

## 2020-02-28 PROCEDURE — D9220A PRA ANESTHESIA: Mod: CRNA,,, | Performed by: NURSE ANESTHETIST, CERTIFIED REGISTERED

## 2020-02-28 PROCEDURE — 37000009 HC ANESTHESIA EA ADD 15 MINS: Performed by: INTERNAL MEDICINE

## 2020-02-28 PROCEDURE — 86850 RBC ANTIBODY SCREEN: CPT

## 2020-02-28 PROCEDURE — 99222 PR INITIAL HOSPITAL CARE,LEVL II: ICD-10-PCS | Mod: ,,, | Performed by: INTERNAL MEDICINE

## 2020-02-28 PROCEDURE — 93010 EKG 12-LEAD: ICD-10-PCS | Mod: ,,, | Performed by: INTERNAL MEDICINE

## 2020-02-28 PROCEDURE — 86901 BLOOD TYPING SEROLOGIC RH(D): CPT

## 2020-02-28 PROCEDURE — 82962 GLUCOSE BLOOD TEST: CPT | Performed by: INTERNAL MEDICINE

## 2020-02-28 PROCEDURE — 99233 SBSQ HOSP IP/OBS HIGH 50: CPT | Mod: 57,,, | Performed by: INTERNAL MEDICINE

## 2020-02-28 PROCEDURE — D9220A PRA ANESTHESIA: Mod: ANES,,, | Performed by: ANESTHESIOLOGY

## 2020-02-28 PROCEDURE — C1898 LEAD, PMKR, OTHER THAN TRANS: HCPCS | Performed by: INTERNAL MEDICINE

## 2020-02-28 PROCEDURE — 99222 1ST HOSP IP/OBS MODERATE 55: CPT | Mod: ,,, | Performed by: INTERNAL MEDICINE

## 2020-02-28 PROCEDURE — G0379 DIRECT REFER HOSPITAL OBSERV: HCPCS

## 2020-02-28 PROCEDURE — 85025 COMPLETE CBC W/AUTO DIFF WBC: CPT

## 2020-02-28 DEVICE — IMPLANTABLE DEVICE
Type: IMPLANTABLE DEVICE | Site: HEART | Status: FUNCTIONAL
Brand: EDORA 8 DR-T

## 2020-02-28 DEVICE — IMPLANTABLE DEVICE
Type: IMPLANTABLE DEVICE | Site: HEART | Status: FUNCTIONAL
Brand: SOLIA

## 2020-02-28 RX ORDER — ACETAMINOPHEN 325 MG/1
650 TABLET ORAL EVERY 4 HOURS PRN
Status: DISCONTINUED | OUTPATIENT
Start: 2020-02-28 | End: 2020-02-29 | Stop reason: HOSPADM

## 2020-02-28 RX ORDER — ONDANSETRON 2 MG/ML
INJECTION INTRAMUSCULAR; INTRAVENOUS
Status: DISCONTINUED | OUTPATIENT
Start: 2020-02-28 | End: 2020-02-28

## 2020-02-28 RX ORDER — FENTANYL CITRATE 50 UG/ML
25 INJECTION, SOLUTION INTRAMUSCULAR; INTRAVENOUS EVERY 5 MIN PRN
Status: DISCONTINUED | OUTPATIENT
Start: 2020-02-28 | End: 2020-02-29 | Stop reason: HOSPADM

## 2020-02-28 RX ORDER — IBUPROFEN 200 MG
16 TABLET ORAL
Status: DISCONTINUED | OUTPATIENT
Start: 2020-02-28 | End: 2020-02-29 | Stop reason: HOSPADM

## 2020-02-28 RX ORDER — IODIXANOL 320 MG/ML
INJECTION, SOLUTION INTRAVASCULAR
Status: DISCONTINUED | OUTPATIENT
Start: 2020-02-28 | End: 2020-02-28

## 2020-02-28 RX ORDER — CEFAZOLIN SODIUM 1 G/3ML
2 INJECTION, POWDER, FOR SOLUTION INTRAMUSCULAR; INTRAVENOUS
Status: COMPLETED | OUTPATIENT
Start: 2020-02-28 | End: 2020-02-29

## 2020-02-28 RX ORDER — IBUPROFEN 200 MG
24 TABLET ORAL
Status: DISCONTINUED | OUTPATIENT
Start: 2020-02-28 | End: 2020-02-29 | Stop reason: HOSPADM

## 2020-02-28 RX ORDER — LIDOCAINE HYDROCHLORIDE 20 MG/ML
INJECTION, SOLUTION INFILTRATION; PERINEURAL
Status: DISCONTINUED | OUTPATIENT
Start: 2020-02-28 | End: 2020-02-29 | Stop reason: HOSPADM

## 2020-02-28 RX ORDER — BUPIVACAINE HYDROCHLORIDE 2.5 MG/ML
INJECTION, SOLUTION EPIDURAL; INFILTRATION; INTRACAUDAL
Status: DISCONTINUED | OUTPATIENT
Start: 2020-02-28 | End: 2020-02-29 | Stop reason: HOSPADM

## 2020-02-28 RX ORDER — DIPHENHYDRAMINE HYDROCHLORIDE 50 MG/ML
25 INJECTION INTRAMUSCULAR; INTRAVENOUS EVERY 6 HOURS PRN
Status: DISCONTINUED | OUTPATIENT
Start: 2020-02-28 | End: 2020-02-29 | Stop reason: HOSPADM

## 2020-02-28 RX ORDER — GLUCAGON 1 MG
1 KIT INJECTION
Status: DISCONTINUED | OUTPATIENT
Start: 2020-02-28 | End: 2020-02-29 | Stop reason: HOSPADM

## 2020-02-28 RX ORDER — PROPOFOL 10 MG/ML
INJECTION, EMULSION INTRAVENOUS
Status: DISCONTINUED | OUTPATIENT
Start: 2020-02-28 | End: 2020-02-28

## 2020-02-28 RX ORDER — INSULIN ASPART 100 [IU]/ML
1-10 INJECTION, SOLUTION INTRAVENOUS; SUBCUTANEOUS
Status: DISCONTINUED | OUTPATIENT
Start: 2020-02-28 | End: 2020-02-29 | Stop reason: HOSPADM

## 2020-02-28 RX ORDER — CEFAZOLIN SODIUM 1 G/3ML
2 INJECTION, POWDER, FOR SOLUTION INTRAMUSCULAR; INTRAVENOUS ONCE
Status: COMPLETED | OUTPATIENT
Start: 2020-02-28 | End: 2020-02-28

## 2020-02-28 RX ORDER — MIDAZOLAM HYDROCHLORIDE 1 MG/ML
INJECTION, SOLUTION INTRAMUSCULAR; INTRAVENOUS
Status: DISCONTINUED | OUTPATIENT
Start: 2020-02-28 | End: 2020-02-28

## 2020-02-28 RX ORDER — CEPHALEXIN 250 MG/1
500 CAPSULE ORAL EVERY 8 HOURS
Status: DISCONTINUED | OUTPATIENT
Start: 2020-02-29 | End: 2020-02-29 | Stop reason: HOSPADM

## 2020-02-28 RX ORDER — ATORVASTATIN CALCIUM 20 MG/1
20 TABLET, FILM COATED ORAL DAILY
Status: DISCONTINUED | OUTPATIENT
Start: 2020-02-28 | End: 2020-02-29 | Stop reason: HOSPADM

## 2020-02-28 RX ORDER — AMLODIPINE BESYLATE 5 MG/1
5 TABLET ORAL DAILY
Status: DISCONTINUED | OUTPATIENT
Start: 2020-02-28 | End: 2020-02-29 | Stop reason: HOSPADM

## 2020-02-28 RX ORDER — LIDOCAINE HCL/PF 100 MG/5ML
SYRINGE (ML) INTRAVENOUS
Status: DISCONTINUED | OUTPATIENT
Start: 2020-02-28 | End: 2020-02-28

## 2020-02-28 RX ORDER — AMITRIPTYLINE HYDROCHLORIDE 25 MG/1
25 TABLET, FILM COATED ORAL NIGHTLY
Status: DISCONTINUED | OUTPATIENT
Start: 2020-02-28 | End: 2020-02-29 | Stop reason: HOSPADM

## 2020-02-28 RX ORDER — CEFAZOLIN SODIUM 1 G/3ML
INJECTION, POWDER, FOR SOLUTION INTRAMUSCULAR; INTRAVENOUS
Status: DISCONTINUED | OUTPATIENT
Start: 2020-02-28 | End: 2020-02-28

## 2020-02-28 RX ORDER — HYDROMORPHONE HYDROCHLORIDE 1 MG/ML
0.2 INJECTION, SOLUTION INTRAMUSCULAR; INTRAVENOUS; SUBCUTANEOUS EVERY 5 MIN PRN
Status: DISCONTINUED | OUTPATIENT
Start: 2020-02-28 | End: 2020-02-29 | Stop reason: HOSPADM

## 2020-02-28 RX ORDER — FENTANYL CITRATE 50 UG/ML
INJECTION, SOLUTION INTRAMUSCULAR; INTRAVENOUS
Status: DISCONTINUED | OUTPATIENT
Start: 2020-02-28 | End: 2020-02-28

## 2020-02-28 RX ORDER — SODIUM CHLORIDE 0.9 % (FLUSH) 0.9 %
3 SYRINGE (ML) INJECTION
Status: DISCONTINUED | OUTPATIENT
Start: 2020-02-28 | End: 2020-02-29 | Stop reason: HOSPADM

## 2020-02-28 RX ORDER — VANCOMYCIN HYDROCHLORIDE 1 G/20ML
INJECTION, POWDER, LYOPHILIZED, FOR SOLUTION INTRAVENOUS
Status: DISCONTINUED | OUTPATIENT
Start: 2020-02-28 | End: 2020-02-29 | Stop reason: HOSPADM

## 2020-02-28 RX ORDER — GLYCOPYRROLATE 0.2 MG/ML
INJECTION INTRAMUSCULAR; INTRAVENOUS
Status: DISCONTINUED | OUTPATIENT
Start: 2020-02-28 | End: 2020-02-28

## 2020-02-28 RX ORDER — SODIUM CHLORIDE 0.9 % (FLUSH) 0.9 %
10 SYRINGE (ML) INJECTION
Status: DISCONTINUED | OUTPATIENT
Start: 2020-02-28 | End: 2020-02-29 | Stop reason: HOSPADM

## 2020-02-28 RX ORDER — PROPOFOL 10 MG/ML
INJECTION, EMULSION INTRAVENOUS CONTINUOUS PRN
Status: DISCONTINUED | OUTPATIENT
Start: 2020-02-28 | End: 2020-02-28

## 2020-02-28 RX ADMIN — HYDROMORPHONE HYDROCHLORIDE 0.2 MG: 1 INJECTION, SOLUTION INTRAMUSCULAR; INTRAVENOUS; SUBCUTANEOUS at 04:02

## 2020-02-28 RX ADMIN — LIDOCAINE HYDROCHLORIDE 80 MG: 20 INJECTION, SOLUTION INTRAVENOUS at 12:02

## 2020-02-28 RX ADMIN — HYDROMORPHONE HYDROCHLORIDE 0.2 MG: 1 INJECTION, SOLUTION INTRAMUSCULAR; INTRAVENOUS; SUBCUTANEOUS at 08:02

## 2020-02-28 RX ADMIN — ONDANSETRON 4 MG: 2 INJECTION INTRAMUSCULAR; INTRAVENOUS at 01:02

## 2020-02-28 RX ADMIN — FENTANYL CITRATE 25 MCG: 50 INJECTION, SOLUTION INTRAMUSCULAR; INTRAVENOUS at 01:02

## 2020-02-28 RX ADMIN — PROPOFOL 150 MCG/KG/MIN: 10 INJECTION, EMULSION INTRAVENOUS at 12:02

## 2020-02-28 RX ADMIN — GLYCOPYRROLATE 0.1 MG: 0.2 INJECTION, SOLUTION INTRAMUSCULAR; INTRAVENOUS at 01:02

## 2020-02-28 RX ADMIN — CEFAZOLIN 2 G: 330 INJECTION, POWDER, FOR SOLUTION INTRAMUSCULAR; INTRAVENOUS at 01:02

## 2020-02-28 RX ADMIN — SODIUM CHLORIDE, SODIUM GLUCONATE, SODIUM ACETATE, POTASSIUM CHLORIDE, MAGNESIUM CHLORIDE, SODIUM PHOSPHATE, DIBASIC, AND POTASSIUM PHOSPHATE: .53; .5; .37; .037; .03; .012; .00082 INJECTION, SOLUTION INTRAVENOUS at 03:02

## 2020-02-28 RX ADMIN — SODIUM CHLORIDE, SODIUM GLUCONATE, SODIUM ACETATE, POTASSIUM CHLORIDE, MAGNESIUM CHLORIDE, SODIUM PHOSPHATE, DIBASIC, AND POTASSIUM PHOSPHATE: .53; .5; .37; .037; .03; .012; .00082 INJECTION, SOLUTION INTRAVENOUS at 12:02

## 2020-02-28 RX ADMIN — INSULIN ASPART 2 UNITS: 100 INJECTION, SOLUTION INTRAVENOUS; SUBCUTANEOUS at 05:02

## 2020-02-28 RX ADMIN — IODIXANOL 5 ML: 320 INJECTION, SOLUTION INTRAVASCULAR at 01:02

## 2020-02-28 RX ADMIN — AMITRIPTYLINE HYDROCHLORIDE 25 MG: 25 TABLET, FILM COATED ORAL at 08:02

## 2020-02-28 RX ADMIN — ATORVASTATIN CALCIUM 20 MG: 20 TABLET, FILM COATED ORAL at 09:02

## 2020-02-28 RX ADMIN — PROPOFOL 30 MG: 10 INJECTION, EMULSION INTRAVENOUS at 12:02

## 2020-02-28 RX ADMIN — MIDAZOLAM HYDROCHLORIDE 2 MG: 1 INJECTION, SOLUTION INTRAMUSCULAR; INTRAVENOUS at 12:02

## 2020-02-28 RX ADMIN — FENTANYL CITRATE 25 MCG: 50 INJECTION, SOLUTION INTRAMUSCULAR; INTRAVENOUS at 02:02

## 2020-02-28 RX ADMIN — CEFAZOLIN 2 G: 1 INJECTION, POWDER, FOR SOLUTION INTRAMUSCULAR; INTRAVENOUS at 10:02

## 2020-02-28 RX ADMIN — INSULIN ASPART 5 UNITS: 100 INJECTION, SOLUTION INTRAVENOUS; SUBCUTANEOUS at 08:02

## 2020-02-28 RX ADMIN — CEFAZOLIN 2 G: 1 INJECTION, POWDER, FOR SOLUTION INTRAMUSCULAR; INTRAVENOUS at 08:02

## 2020-02-28 RX ADMIN — ACETAMINOPHEN 650 MG: 325 TABLET ORAL at 04:02

## 2020-02-28 RX ADMIN — FENTANYL CITRATE 50 MCG: 50 INJECTION, SOLUTION INTRAMUSCULAR; INTRAVENOUS at 03:02

## 2020-02-28 NOTE — ASSESSMENT & PLAN NOTE
Laura Lee is a 49 y.o. F with T2DM, HTN, who was transferred from [facility] for further evaluation of braydcardia. 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 She presented to the ER, where she was observed overnight and discharged the next day. Daughter reports that after went home, patient returned to the ER because symptoms had not resolved. At this point, they were given IV benadryl, steroids and epinepherine, which did improve her symptoms.   Pt was noted to incidentally develop bradycardia overnight on 2/23/20. She denies any chest pain or shortness of breath at that time. She was transferred her for further evaluation.   - TTE 2/26/20: EF 55%  - plan for DC PPM today. Pt informed of risk and benefits and consented for procedure

## 2020-02-28 NOTE — BRIEF OP NOTE
: Everardo Kan MD    Post-operative Diagnosis: Symptomatic bradycardia, intermittent AV block    Procedure Performed: dual chamber permanent pacemaker implant    Description of Procedure: The patient was brought to the EP lab in the fasting state. Prepped and draped in sterile fashion. Safety timeout was performed. Sedation administered by anesthesia staff. Selective venogram of the left axillary and cephalic veins performed via left ac IV. Lidocaine used for local anesthetic. Fluoroscopic guided axillary access utilized. Guide/J Wire advanced and confirmed in IVC. Incision made. Blunt and electrocautery dissection performed. Pocket made. Second fluoroscopic guided axillary access obtained. Guide/J wire advanced and confirmed in IVC. Peel away sheath advanced over J wire. RV lead advanced into RV. R waves and injury pattern adequate. Lead fixed into place and sutured in place. Second peel away sheath advanced over J wire. RA lead advanced into RA via peel away sheath. After adequate p waves and current of injury detected, the RA lead was fixed into place in the RA appendage. Peel away sheath removed and RA lead sutured into place. Pocket washed using antibiotic solution. Leads connected to generator. Generator placed into pocket, sutured in place, and washed with antibiotic solution. Deep layer closed with interrupted 3-0 suture. Intermediate layer closed with running 3-0 suture. Superficial layer closed with running 4-0 suture. Skin closed with Dermabond. Meplex dressing to be placed after dermabond has dried.     EBL: <10 mL    Specimens Removed: None  Complications: no immediate    1. Ancef 2 grams q8 hours x 2 doses (ordered)  2. Sling to left arm - wear for 48 hours, then only at night for 6 weeks.  3. NO HEPARIN PRODUCTS  4. Keflex 500 mg TID for 5 days at discharge (or after the 2 doses of IV antibiotics if still in the hospital)  5. No lifting left elbow above shoulder height  6. No lifting  over 5 pounds  7. No driving for 1 week and for 4 weeks if patient uses left arm to make turns  8. Dressing will be removed in AM by EP  9. Chest Xray (ordered)  10. Follow up in device clinic in 1 week for device and wound checks.     Dr Kan, the attending electrophysiologist, was present for the entirety of this procedure.    Artie Cook MD PGY7

## 2020-02-28 NOTE — TRANSFER OF CARE
"Anesthesia Transfer of Care Note    Patient: Laura Lee    Procedure(s) Performed: Procedure(s) (LRB):  INSERTION, CARDIAC PACEMAKER, DUAL CHAMBER (N/A)    Patient location: PACU    Anesthesia Type: general    Transport from OR: Transported from OR on 6-10 L/min O2 by face mask with adequate spontaneous ventilation    Post pain: adequate analgesia    Post assessment: no apparent anesthetic complications    Post vital signs: stable    Level of consciousness: awake, alert and oriented    Nausea/Vomiting: no nausea/vomiting    Complications: none    Transfer of care protocol was followed      Last vitals:   Visit Vitals  BP (!) 142/78 (BP Location: Left arm, Patient Position: Lying)   Pulse 65   Temp 36.5 °C (97.7 °F) (Oral)   Resp 17   Ht 5' 3" (1.6 m)   Wt 100 kg (220 lb 7.4 oz)   LMP 01/09/2018 (Within Months)   SpO2 97%   Breastfeeding? No   BMI 39.05 kg/m²     "

## 2020-02-28 NOTE — NURSING TRANSFER
Nursing Transfer Note      2/28/2020     Transfer To: ep pacu 3 to 325    Transfer via stretcher    Transfer with cardiac monitoring, tele box on. Confirmed by tele tech    Transported by hospital transporter    Medicines sent: none.  Pt does need coverage from sliding scale. Aristides field rn notified    Chart send with patient: Yes    Notified: [email or web address]    Patient reassessed at: 2/28/2020 1645  Upon arrival to floor: cardiac monitor applied, patient oriented to room, call bell in reach and bed in lowest position

## 2020-02-28 NOTE — TELEPHONE ENCOUNTER
"Called and notified by [employer] ambulatory event monitoring company over night while on call about symptomatic bradycardia. Pauses of about 2.6 and 6 seconds were noted on monitor.   I called and spoke with Ms Lee. At the time of the rhythm changes in question, she felt lightheaded, very tired, and felt as though she might pass out. She does not think she lost consciousness. She also felt like she was going to "doze off" which is unusual for her per her report.   I asked her to come in to be evaluated. She lives about an hour away from [facility] and does not have a way to get to [facility] at this time. She lives about 5 minutes away from [facility]. I have asked her to present to the nearest hospital for evaluation (which is [facility]).     Artie Cook MD PGY7      "

## 2020-02-28 NOTE — NURSING
Patient now leaving floor on stretcher with transport for EP lab.  Patient is a,a,ox4, VSS, and no complaints at this time.

## 2020-02-28 NOTE — PLAN OF CARE
02/28/20 1342   Post-Acute Status   Post-Acute Authorization Other   Other Status No Post-Acute Service Needs

## 2020-02-28 NOTE — NURSING
Patient arrived to room 325 on stretcher with transport.  Patient is a,a,ox4, VSS, and has no complaints at this time.  Patient will remain on bedrest until 1850 per orders.  Patient resting comfortably in bed locked in lowest position, side rails up x3, and call bell in reach.  Will continue to monitor.

## 2020-02-28 NOTE — ANESTHESIA PREPROCEDURE EVALUATION
02/28/2020  Pre-operative evaluation for Procedure(s) (LRB):  INSERTION, CARDIAC PACEMAKER, DUAL CHAMBER (N/A)    Laura Lee is a 49 y.o. female     Patient Active Problem List   Diagnosis    Symptomatic sinus bradycardia    T2DM (type 2 diabetes mellitus)    Essential hypertension    Tobacco abuse    Atrial fibrillation with RVR    Bradycardia       Review of patient's allergies indicates:  No Known Allergies    No current facility-administered medications on file prior to encounter.      Current Outpatient Medications on File Prior to Encounter   Medication Sig Dispense Refill    amitriptyline (ELAVIL) 25 MG tablet Take 25 mg by mouth every evening.       amLODIPine (NORVASC) 5 MG tablet Take 1 tablet (5 mg total) by mouth once daily. 30 tablet 3    apixaban (ELIQUIS) 5 mg Tab Take 1 tablet (5 mg total) by mouth 2 (two) times daily. 30 tablet 3    atorvastatin (LIPITOR) 20 MG tablet Take 20 mg by mouth once daily.       estradiol (ESTRACE) 1 MG tablet Take 1 tablet (1 mg total) by mouth once daily. 30 tablet 1    medroxyPROGESTERone (PROVERA) 10 MG tablet Take 1 tablet (10 mg total) by mouth once daily. 30 tablet 1    metFORMIN (GLUCOPHAGE-XR) 500 MG 24 hr tablet Take 2 tablets by mouth once daily.      metoprolol tartrate (LOPRESSOR) 25 MG tablet Take 1 tablet (25 mg total) by mouth 2 (two) times daily. 60 tablet 3       Past Surgical History:   Procedure Laterality Date    TUBAL LIGATION         Social History     Socioeconomic History    Marital status: Single     Spouse name: Not on file    Number of children: Not on file    Years of education: Not on file    Highest education level: Not on file   Occupational History    Not on file   Social Needs    Financial resource strain: Not on file    Food insecurity:     Worry: Not on file     Inability: Not on file    Transportation  needs:     Medical: Not on file     Non-medical: Not on file   Tobacco Use    Smoking status: Current Every Day Smoker     Types: Vaping with nicotine    Smokeless tobacco: Never Used   Substance and Sexual Activity    Alcohol use: Yes     Comment: seldom    Drug use: No    Sexual activity: Yes     Partners: Male     Birth control/protection: See Surgical Hx     Comment: single   Lifestyle    Physical activity:     Days per week: Not on file     Minutes per session: Not on file    Stress: Not on file   Relationships    Social connections:     Talks on phone: Not on file     Gets together: Not on file     Attends Shinto service: Not on file     Active member of club or organization: Not on file     Attends meetings of clubs or organizations: Not on file     Relationship status: Not on file   Other Topics Concern    Not on file   Social History Narrative    Not on file         CBC:   Recent Labs     02/26/20  0619 02/28/20  0825   WBC 5.23 7.78   RBC 4.36 4.65   HGB 13.6 14.9   HCT 41.3 43.6    235   MCV 95 94   MCH 31.2* 32.0*   MCHC 32.9 34.2       CMP:   Recent Labs     02/26/20  0619      K 4.0      CO2 27   BUN 19   CREATININE 0.8   *   MG 1.3*   CALCIUM 8.9       INR  Recent Labs     02/28/20  0825   INR 1.0   APTT 23.7             Anesthesia Evaluation    I have reviewed the Patient Summary Reports.    I have reviewed the Nursing Notes.   I have reviewed the Medications.     Review of Systems  Anesthesia Hx:  No problems with previous Anesthesia    Cardiovascular:   Hypertension Dysrhythmias    Pulmonary:  Pulmonary Normal    Hepatic/GI:   GERD    Neurological:  Neurology Normal    Endocrine:   Diabetes        Physical Exam  General:  Well nourished    Airway/Jaw/Neck:  Airway Findings: Mouth Opening: Normal Tongue: Normal  General Airway Assessment: Adult  Mallampati: II  TM Distance: Normal, at least 6 cm       Chest/Lungs:  Chest/Lungs Findings: Clear to auscultation,  Normal Respiratory Rate     Heart/Vascular:  Heart Findings: Rate: Normal  Rhythm: Regular Rhythm             Anesthesia Plan  Type of Anesthesia, risks & benefits discussed:  Anesthesia Type:  general, MAC  Patient's Preference:   Intra-op Monitoring Plan: standard ASA monitors  Intra-op Monitoring Plan Comments:   Post Op Pain Control Plan: multimodal analgesia and IV/PO Opioids PRN  Post Op Pain Control Plan Comments:   Induction:   IV  Beta Blocker:         Informed Consent: Patient understands risks and agrees with Anesthesia plan.  Questions answered. Anesthesia consent signed with patient.  ASA Score: 3     Day of Surgery Review of History & Physical:            Ready For Surgery From Anesthesia Perspective.

## 2020-02-28 NOTE — HPI
49 y.o. F with T2DM, HTN, newly diagnosed atrial fibrillation on Eliquis and metoprolol. Patient was recently discharged on 2/26 after being admitted for sinus bradycardia, asymptomatic at that time. She was found to have atrial fibrillation that spontaneously converted to NSR. She was started on Eliquis and left with a 30 day event monitor. Last night, she was noted to have a 2.6 and 6 second pause per EP fellow's note. She reported symptoms at that time and was asked to presented to her local ED. She is transferred to [facility] for PPM implantation. This morning reports feeling well. No other issues.

## 2020-02-28 NOTE — PLAN OF CARE
02/28/20 1337   Discharge Assessment   Assessment Type Discharge Planning Assessment   Confirmed/corrected address and phone number on facesheet? Yes   Assessment information obtained from? Medical Record   Expected Length of Stay (days) 2   Communicated expected length of stay with patient/caregiver yes   Prior to hospitilization cognitive status: Alert/Oriented   Prior to hospitalization functional status: Independent   Current cognitive status: Alert/Oriented   Current Functional Status: Independent   Lives With significant other   Able to Return to Prior Arrangements yes   Is patient able to care for self after discharge? Yes   Patient's perception of discharge disposition home or selfcare   Readmission Within the Last 30 Days no previous admission in last 30 days  (previously in Obs)   Patient currently being followed by outpatient case management? No   Patient currently receives any other outside agency services? No   Equipment Currently Used at Home none   Do you have any problems affording any of your prescribed medications? TBD   Discharge Plan A Home   DME Needed Upon Discharge  none   Admitted with symptomatic bradycardia. Plan is for PPM placement.  Known to this CM from recent Obs stay. Lives with SO and is independent in her ADLs. Plan is to DC home. No DC needs anticipated.

## 2020-02-28 NOTE — PLAN OF CARE
"Vss. Pt atrial paced on cm. Tele box on for room 325. Tele tech confirmed pt seen on monitor.  Pt's boyfriend updated over phone and text messaging system. Remains in pt's room.  Pt arrived with left chest wall incision with foam drsg already in place.  Per ep lab rn maria eugenia kaur, dermabond well approx, foam drsg.  Guaze/pressure drsg placed and sling to left arm.  12 lead ekg and chest xray done per md order. Pt tolerating water in ep pacu 3.  Pt voided with pure wick clear yellow urine 400ml noted. Removed prior to transport to room. Pt has holter monitor in clear bag with name on it.  With her in hand upon transfer back to room.  Pt has biotronik pacemaker dddr, low limit 60.  See flowsheet for full assessment.  Pt with "discomfort to left chest wall incision".  Prn po and iv pain meds given per md order. Upon transfer back to room states "tolerable".   "

## 2020-02-28 NOTE — ASSESSMENT & PLAN NOTE
- One episode of atrial fibrillation on last admission, resolved spontaneously  - On Eliquis, will hold for now  - Resume metoprolol after PPM

## 2020-02-28 NOTE — SUBJECTIVE & OBJECTIVE
Past Medical History:   Diagnosis Date    Anxiety     Diabetes mellitus     GERD (gastroesophageal reflux disease)     Hyperlipidemia     Hypertension        Past Surgical History:   Procedure Laterality Date    TUBAL LIGATION         Review of patient's allergies indicates:  No Known Allergies    No current facility-administered medications on file prior to encounter.      Current Outpatient Medications on File Prior to Encounter   Medication Sig    amitriptyline (ELAVIL) 25 MG tablet Take 25 mg by mouth every evening.     amLODIPine (NORVASC) 5 MG tablet Take 1 tablet (5 mg total) by mouth once daily.    apixaban (ELIQUIS) 5 mg Tab Take 1 tablet (5 mg total) by mouth 2 (two) times daily.    atorvastatin (LIPITOR) 20 MG tablet Take 20 mg by mouth once daily.     estradiol (ESTRACE) 1 MG tablet Take 1 tablet (1 mg total) by mouth once daily.    medroxyPROGESTERone (PROVERA) 10 MG tablet Take 1 tablet (10 mg total) by mouth once daily.    metFORMIN (GLUCOPHAGE-XR) 500 MG 24 hr tablet Take 2 tablets by mouth once daily.    metoprolol tartrate (LOPRESSOR) 25 MG tablet Take 1 tablet (25 mg total) by mouth 2 (two) times daily.     Family History     Problem Relation (Age of Onset)    Breast cancer Maternal Aunt    Cancer Mother        Tobacco Use    Smoking status: Current Every Day Smoker     Types: Vaping with nicotine    Smokeless tobacco: Never Used   Substance and Sexual Activity    Alcohol use: Yes     Comment: seldom    Drug use: No    Sexual activity: Yes     Partners: Male     Birth control/protection: See Surgical Hx     Comment: single     Review of Systems   Constitution: Negative for chills and fever.   Cardiovascular: Negative for chest pain, dyspnea on exertion, palpitations and syncope.   Respiratory: Negative for cough and sleep disturbances due to breathing.    Musculoskeletal: Negative for back pain.   Gastrointestinal: Negative for abdominal pain, nausea and vomiting.    Neurological: Positive for dizziness. Negative for focal weakness.   Psychiatric/Behavioral: Negative for altered mental status.     Objective:     Vital Signs (Most Recent):  Temp: 98.1 °F (36.7 °C) (02/28/20 0823)  Pulse: 80 (02/28/20 0823)  Resp: 14 (02/28/20 0823)  BP: 138/81 (02/28/20 0823)  SpO2: 98 % (02/28/20 0823) Vital Signs (24h Range):  Temp:  [98.1 °F (36.7 °C)-98.5 °F (36.9 °C)] 98.1 °F (36.7 °C)  Pulse:  [67-80] 80  Resp:  [14-20] 14  SpO2:  [95 %-98 %] 98 %  BP: (115-138)/(67-81) 138/81     Weight: 100 kg (220 lb 7.4 oz)  Body mass index is 39.05 kg/m².    SpO2: 98 %       No intake or output data in the 24 hours ending 02/28/20 1135    Lines/Drains/Airways     Peripheral Intravenous Line                 Peripheral IV - Single Lumen 02/27/20 0929 20 G Distal;Left;Posterior Forearm 1 day         Peripheral IV - Single Lumen 02/28/20 1054 20 G Right Antecubital less than 1 day                Physical Exam   Constitutional: She is oriented to person, place, and time. She appears well-developed and well-nourished. No distress.   HENT:   Head: Normocephalic and atraumatic.   Eyes: Conjunctivae and EOM are normal.   Neck: Normal range of motion. No tracheal deviation present.   Cardiovascular: Normal rate, regular rhythm and normal heart sounds.   No murmur heard.  Pulmonary/Chest: Effort normal and breath sounds normal. No respiratory distress. She has no wheezes.   Abdominal: Soft. Bowel sounds are normal. She exhibits no distension. There is no tenderness.   Musculoskeletal: Normal range of motion. She exhibits no edema.   Neurological: She is alert and oriented to person, place, and time.   Skin: She is not diaphoretic.       Significant Labs: All pertinent lab results from the last 24 hours have been reviewed.    Significant Imaging: All pertinent images from the last 24h have been reviewed.

## 2020-02-28 NOTE — Clinical Note
Lead inserted, under fluorscopic guidance, into the  Left Axillary Vein. New leads were attached to the following locations: right ventricle.

## 2020-02-28 NOTE — SUBJECTIVE & OBJECTIVE
Past Medical History:   Diagnosis Date    Anxiety     Diabetes mellitus     GERD (gastroesophageal reflux disease)     Hyperlipidemia     Hypertension        Past Surgical History:   Procedure Laterality Date    TUBAL LIGATION         Review of patient's allergies indicates:  No Known Allergies    No current facility-administered medications on file prior to encounter.      Current Outpatient Medications on File Prior to Encounter   Medication Sig    amitriptyline (ELAVIL) 25 MG tablet Take 25 mg by mouth every evening.     amLODIPine (NORVASC) 5 MG tablet Take 1 tablet (5 mg total) by mouth once daily.    apixaban (ELIQUIS) 5 mg Tab Take 1 tablet (5 mg total) by mouth 2 (two) times daily.    atorvastatin (LIPITOR) 20 MG tablet Take 20 mg by mouth once daily.     estradiol (ESTRACE) 1 MG tablet Take 1 tablet (1 mg total) by mouth once daily.    medroxyPROGESTERone (PROVERA) 10 MG tablet Take 1 tablet (10 mg total) by mouth once daily.    metFORMIN (GLUCOPHAGE-XR) 500 MG 24 hr tablet Take 2 tablets by mouth once daily.    metoprolol tartrate (LOPRESSOR) 25 MG tablet Take 1 tablet (25 mg total) by mouth 2 (two) times daily.     Family History     Problem Relation (Age of Onset)    Breast cancer Maternal Aunt    Cancer Mother        Tobacco Use    Smoking status: Current Every Day Smoker     Types: Vaping with nicotine    Smokeless tobacco: Never Used   Substance and Sexual Activity    Alcohol use: Yes     Comment: seldom    Drug use: No    Sexual activity: Yes     Partners: Male     Birth control/protection: See Surgical Hx     Comment: single     Review of Systems   Constitution: Negative for chills and fever.   Cardiovascular: Negative for chest pain, dyspnea on exertion, irregular heartbeat, near-syncope and syncope.   Respiratory: Negative for shortness of breath.    Gastrointestinal: Negative for nausea.   Neurological: Negative for headaches and weakness.     Objective:     Vital Signs (Most  Recent):  Temp: 98.1 °F (36.7 °C) (02/28/20 0823)  Pulse: 80 (02/28/20 0823)  Resp: 14 (02/28/20 0823)  BP: 138/81 (02/28/20 0823)  SpO2: 98 % (02/28/20 0823) Vital Signs (24h Range):  Temp:  [98.1 °F (36.7 °C)-98.5 °F (36.9 °C)] 98.1 °F (36.7 °C)  Pulse:  [67-80] 80  Resp:  [14-20] 14  SpO2:  [95 %-98 %] 98 %  BP: (115-138)/(67-81) 138/81       Weight: 100 kg (220 lb 7.4 oz)  Body mass index is 39.05 kg/m².    SpO2: 98 %       Physical Exam   Constitutional: She is oriented to person, place, and time. She appears well-developed and well-nourished.   HENT:   Head: Normocephalic and atraumatic.   Eyes: Pupils are equal, round, and reactive to light.   Neck: No JVD present.   Cardiovascular: Normal rate and regular rhythm.   No murmur heard.  Pulmonary/Chest: Effort normal and breath sounds normal. No respiratory distress.   Abdominal: Soft. Bowel sounds are normal. She exhibits no distension. There is no tenderness.   Musculoskeletal: She exhibits no edema.   Neurological: She is alert and oriented to person, place, and time.   Skin: Skin is warm and dry. No erythema.   Psychiatric: Her behavior is normal. Judgment and thought content normal.   Vitals reviewed.      Significant Labs:   CMP: No results for input(s): NA, K, CL, CO2, GLU, BUN, CREATININE, CALCIUM, PROT, ALBUMIN, BILITOT, ALKPHOS, AST, ALT, ANIONGAP, ESTGFRAFRICA, EGFRNONAA in the last 48 hours. and CBC:   Recent Labs   Lab 02/28/20 0825   WBC 7.78   HGB 14.9   HCT 43.6          Significant Imaging: All new imaging reviewed

## 2020-02-28 NOTE — H&P
[facility]  Cardiology  History and Physical     Patient Name: Laura Lee  MRN: [id number]  Admission Date: 2/28/2020  Code Status: Full Code   Attending Provider: Maximino Merino MD   Primary Care Physician: Les Bryant MD  Principal Problem:Symptomatic sinus bradycardia    Patient information was obtained from patient and past medical records.     Subjective:     Chief Complaint:  bradycardia     HPI:  49 y.o. F with T2DM, HTN, newly diagnosed atrial fibrillation on Eliquis and metoprolol. Patient was recently discharged on 2/26 after being admitted for sinus bradycardia, asymptomatic at that time. She was found to have atrial fibrillation that spontaneously converted to NSR. She was started on Eliquis and left with a 30 day event monitor. Last night, she was noted to have a 2.6 and 6 second pause per EP fellow's note. She reported symptoms at that time and was asked to presented to her local ED. She is transferred to [facility] for PPM implantation. This morning reports feeling well. No other issues.     Past Medical History:   Diagnosis Date    Anxiety     Diabetes mellitus     GERD (gastroesophageal reflux disease)     Hyperlipidemia     Hypertension        Past Surgical History:   Procedure Laterality Date    TUBAL LIGATION         Review of patient's allergies indicates:  No Known Allergies    No current facility-administered medications on file prior to encounter.      Current Outpatient Medications on File Prior to Encounter   Medication Sig    amitriptyline (ELAVIL) 25 MG tablet Take 25 mg by mouth every evening.     amLODIPine (NORVASC) 5 MG tablet Take 1 tablet (5 mg total) by mouth once daily.    apixaban (ELIQUIS) 5 mg Tab Take 1 tablet (5 mg total) by mouth 2 (two) times daily.    atorvastatin (LIPITOR) 20 MG tablet Take 20 mg by mouth once daily.     estradiol (ESTRACE) 1 MG tablet Take 1 tablet (1 mg total) by mouth once daily.    medroxyPROGESTERone (PROVERA) 10  MG tablet Take 1 tablet (10 mg total) by mouth once daily.    metFORMIN (GLUCOPHAGE-XR) 500 MG 24 hr tablet Take 2 tablets by mouth once daily.    metoprolol tartrate (LOPRESSOR) 25 MG tablet Take 1 tablet (25 mg total) by mouth 2 (two) times daily.     Family History     Problem Relation (Age of Onset)    Breast cancer Maternal Aunt    Cancer Mother        Tobacco Use    Smoking status: Current Every Day Smoker     Types: Vaping with nicotine    Smokeless tobacco: Never Used   Substance and Sexual Activity    Alcohol use: Yes     Comment: seldom    Drug use: No    Sexual activity: Yes     Partners: Male     Birth control/protection: See Surgical Hx     Comment: single     Review of Systems   Constitution: Negative for chills and fever.   Cardiovascular: Negative for chest pain, dyspnea on exertion, palpitations and syncope.   Respiratory: Negative for cough and sleep disturbances due to breathing.    Musculoskeletal: Negative for back pain.   Gastrointestinal: Negative for abdominal pain, nausea and vomiting.   Neurological: Positive for dizziness. Negative for focal weakness.   Psychiatric/Behavioral: Negative for altered mental status.     Objective:     Vital Signs (Most Recent):  Temp: 98.1 °F (36.7 °C) (02/28/20 0823)  Pulse: 80 (02/28/20 0823)  Resp: 14 (02/28/20 0823)  BP: 138/81 (02/28/20 0823)  SpO2: 98 % (02/28/20 0823) Vital Signs (24h Range):  Temp:  [98.1 °F (36.7 °C)-98.5 °F (36.9 °C)] 98.1 °F (36.7 °C)  Pulse:  [67-80] 80  Resp:  [14-20] 14  SpO2:  [95 %-98 %] 98 %  BP: (115-138)/(67-81) 138/81     Weight: 100 kg (220 lb 7.4 oz)  Body mass index is 39.05 kg/m².    SpO2: 98 %       No intake or output data in the 24 hours ending 02/28/20 1135    Lines/Drains/Airways     Peripheral Intravenous Line                 Peripheral IV - Single Lumen 02/27/20 0929 20 G Distal;Left;Posterior Forearm 1 day         Peripheral IV - Single Lumen 02/28/20 1054 20 G Right Antecubital less than 1 day                 Physical Exam   Constitutional: She is oriented to person, place, and time. She appears well-developed and well-nourished. No distress.   HENT:   Head: Normocephalic and atraumatic.   Eyes: Conjunctivae and EOM are normal.   Neck: Normal range of motion. No tracheal deviation present.   Cardiovascular: Normal rate, regular rhythm and normal heart sounds.   No murmur heard.  Pulmonary/Chest: Effort normal and breath sounds normal. No respiratory distress. She has no wheezes.   Abdominal: Soft. Bowel sounds are normal. She exhibits no distension. There is no tenderness.   Musculoskeletal: Normal range of motion. She exhibits no edema.   Neurological: She is alert and oriented to person, place, and time.   Skin: She is not diaphoretic.       Significant Labs: All pertinent lab results from the last 24 hours have been reviewed.    Significant Imaging: All pertinent images from the last 24h have been reviewed.      Assessment and Plan:     * Symptomatic sinus bradycardia  - Patient with 2/6 and 6 seconds pause while watching TV, symptomatic at that time.   - Plan for PPM implantation today  - NPO  - Last dose of Eliquis was last night    Sinus pause  - See symptomatic sinus bradycardia    Paroxysmal A-fib  - One episode of atrial fibrillation on last admission, resolved spontaneously  - On Eliquis, will hold for now  - Resume metoprolol after PPM    Essential hypertension  - On amlodipine 5 mg daily, continue    T2DM (type 2 diabetes mellitus)  - On metformin at home, will hold  - Insulin sliding scale for now        VTE Risk Mitigation (From admission, onward)         Ordered     IP VTE LOW RISK PATIENT  Once      02/28/20 0811                Angelo Mckeon MD  Cardiology   [facility]

## 2020-02-28 NOTE — ASSESSMENT & PLAN NOTE
- Patient with 2/6 and 6 seconds pause while watching TV, symptomatic at that time.   - Plan for PPM implantation today  - NPO  - Last dose of Eliquis was last night

## 2020-02-28 NOTE — Clinical Note
Lead inserted, under fluorscopic guidance, into the  Left Axillary Vein. New leads were attached to the following locations: right atrium and right ventricle.

## 2020-02-28 NOTE — CONSULTS
[facility]  Cardiac Electrophysiology  Consult Note    Admission Date: 2/28/2020  Code Status: Full Code   Attending Provider: Maximino Merino MD  Consulting Provider: Amee Matias MD  Principal Problem:<principal problem not specified>    Inpatient consult to Electrophysiology  Consult performed by: Amee Matias MD  Consult ordered by: Angelo Mckeon MD        Subjective:     Chief Complaint:  Sinus pause    HPI:   Laura Lee is a 49 y.o. F with T2DM, HTN, HFpEF who presents after incidental 6-sec pause on event monitor. She initially presented 2/24/20 after she developed bradycardia at OSH on 2/23/20 however she did not have any further events while admitted and was discharged with event monitor. Of note she was incidentally noted to be in atrial fibrillation on tele and started on apixaban prior to discharge.    Last night, she was noted to have 2.6 sec and 6 sec pauses on monitor. EP fellow on call was notified, who spoke to the patient and she reports symptoms of lightheadedness, fatigue and presyncopal symptoms. She was asked to come to the nearest ER and has been transferred here for PPM evaluation.     She denies any episodes of syncope or LOC. She was watching TV approximately the time when this event would have occurred and reports she may have dozed off, which is unusual for her. At baseline she is deconditioned and reports SOB after mild exertion such as working around her house, 5-10 minutes of walking. She denies any chest pain with exertion.     She is unable to lie flat because of chronic back pain and reports walking every few nights with SOB. She has never seen a cardiologist or had further workup of these symptoms. She does snore and had not been tested for sleep apnea.    She is non-compliant with her medications as prescribed. She reports a variable sleeping schedule i.e. sleeping during the daytime and not having an appetite for dinner therefore not taking her  evening meds.        Past Medical History:   Diagnosis Date    Anxiety     Diabetes mellitus     GERD (gastroesophageal reflux disease)     Hyperlipidemia     Hypertension        Past Surgical History:   Procedure Laterality Date    TUBAL LIGATION         Review of patient's allergies indicates:  No Known Allergies    No current facility-administered medications on file prior to encounter.      Current Outpatient Medications on File Prior to Encounter   Medication Sig    amitriptyline (ELAVIL) 25 MG tablet Take 25 mg by mouth every evening.     amLODIPine (NORVASC) 5 MG tablet Take 1 tablet (5 mg total) by mouth once daily.    apixaban (ELIQUIS) 5 mg Tab Take 1 tablet (5 mg total) by mouth 2 (two) times daily.    atorvastatin (LIPITOR) 20 MG tablet Take 20 mg by mouth once daily.     estradiol (ESTRACE) 1 MG tablet Take 1 tablet (1 mg total) by mouth once daily.    medroxyPROGESTERone (PROVERA) 10 MG tablet Take 1 tablet (10 mg total) by mouth once daily.    metFORMIN (GLUCOPHAGE-XR) 500 MG 24 hr tablet Take 2 tablets by mouth once daily.    metoprolol tartrate (LOPRESSOR) 25 MG tablet Take 1 tablet (25 mg total) by mouth 2 (two) times daily.     Family History     Problem Relation (Age of Onset)    Breast cancer Maternal Aunt    Cancer Mother        Tobacco Use    Smoking status: Current Every Day Smoker     Types: Vaping with nicotine    Smokeless tobacco: Never Used   Substance and Sexual Activity    Alcohol use: Yes     Comment: seldom    Drug use: No    Sexual activity: Yes     Partners: Male     Birth control/protection: See Surgical Hx     Comment: single     Review of Systems   Constitution: Negative for chills and fever.   Cardiovascular: Negative for chest pain, dyspnea on exertion, irregular heartbeat, near-syncope and syncope.   Respiratory: Negative for shortness of breath.    Gastrointestinal: Negative for nausea.   Neurological: Negative for headaches and weakness.     Objective:      Vital Signs (Most Recent):  Temp: 98.1 °F (36.7 °C) (02/28/20 0823)  Pulse: 80 (02/28/20 0823)  Resp: 14 (02/28/20 0823)  BP: 138/81 (02/28/20 0823)  SpO2: 98 % (02/28/20 0823) Vital Signs (24h Range):  Temp:  [98.1 °F (36.7 °C)-98.5 °F (36.9 °C)] 98.1 °F (36.7 °C)  Pulse:  [67-80] 80  Resp:  [14-20] 14  SpO2:  [95 %-98 %] 98 %  BP: (115-138)/(67-81) 138/81       Weight: 100 kg (220 lb 7.4 oz)  Body mass index is 39.05 kg/m².    SpO2: 98 %       Physical Exam   Constitutional: She is oriented to person, place, and time. She appears well-developed and well-nourished.   HENT:   Head: Normocephalic and atraumatic.   Eyes: Pupils are equal, round, and reactive to light.   Neck: No JVD present.   Cardiovascular: Normal rate and regular rhythm.   No murmur heard.  Pulmonary/Chest: Effort normal and breath sounds normal. No respiratory distress.   Abdominal: Soft. Bowel sounds are normal. She exhibits no distension. There is no tenderness.   Musculoskeletal: She exhibits no edema.   Neurological: She is alert and oriented to person, place, and time.   Skin: Skin is warm and dry. No erythema.   Psychiatric: Her behavior is normal. Judgment and thought content normal.   Vitals reviewed.      Significant Labs:   CMP: No results for input(s): NA, K, CL, CO2, GLU, BUN, CREATININE, CALCIUM, PROT, ALBUMIN, BILITOT, ALKPHOS, AST, ALT, ANIONGAP, ESTGFRAFRICA, EGFRNONAA in the last 48 hours. and CBC:   Recent Labs   Lab 02/28/20 0825   WBC 7.78   HGB 14.9   HCT 43.6          Significant Imaging: All new imaging reviewed      Assessment and Plan:     Symptomatic sinus pause  Atrial fibrillation   Laura Lee is a 49 y.o. F with T2DM, HTN, HFpEF who as transferred her for PPM evaluation for 6 sec pause on event monitor. She reports symptoms of light headedness, fatigue at the time.     - TTE 2/26/20: EF 55%  - plan for DC PPM today. Pt informed of risk and benefits and consented for procedure  - currently in NSR; h/o  atrial fibrillation; hold apixaban for now & will resume prior to discharge  - pre-procedure cefazolin 2g ordered   - continue tele monitoring  - keep NPO    Thank you for your consult. I will follow-up with patient. Please contact us if you have any additional questions.     Patient seen and plan of care discussed with Dr. Kan.    Amee Mtaias MD  Cardiac Electrophysiology  [facility]

## 2020-02-29 VITALS
RESPIRATION RATE: 18 BRPM | DIASTOLIC BLOOD PRESSURE: 68 MMHG | SYSTOLIC BLOOD PRESSURE: 117 MMHG | BODY MASS INDEX: 39.06 KG/M2 | HEIGHT: 63 IN | TEMPERATURE: 97 F | WEIGHT: 220.44 LBS | OXYGEN SATURATION: 97 % | HEART RATE: 74 BPM

## 2020-02-29 LAB
ANION GAP SERPL CALC-SCNC: 11 MMOL/L (ref 8–16)
BUN SERPL-MCNC: 14 MG/DL (ref 6–20)
CALCIUM SERPL-MCNC: 9.3 MG/DL (ref 8.7–10.5)
CHLORIDE SERPL-SCNC: 102 MMOL/L (ref 95–110)
CO2 SERPL-SCNC: 25 MMOL/L (ref 23–29)
CREAT SERPL-MCNC: 1.1 MG/DL (ref 0.5–1.4)
EST. GFR  (AFRICAN AMERICAN): >60 ML/MIN/1.73 M^2
EST. GFR  (NON AFRICAN AMERICAN): 58.3 ML/MIN/1.73 M^2
GLUCOSE SERPL-MCNC: 405 MG/DL (ref 70–110)
POCT GLUCOSE: 235 MG/DL (ref 70–110)
POCT GLUCOSE: 305 MG/DL (ref 70–110)
POTASSIUM SERPL-SCNC: 4.3 MMOL/L (ref 3.5–5.1)
SODIUM SERPL-SCNC: 138 MMOL/L (ref 136–145)

## 2020-02-29 PROCEDURE — G0378 HOSPITAL OBSERVATION PER HR: HCPCS

## 2020-02-29 PROCEDURE — 99239 PR HOSPITAL DISCHARGE DAY,>30 MIN: ICD-10-PCS | Mod: ,,, | Performed by: INTERNAL MEDICINE

## 2020-02-29 PROCEDURE — 63600175 PHARM REV CODE 636 W HCPCS: Performed by: STUDENT IN AN ORGANIZED HEALTH CARE EDUCATION/TRAINING PROGRAM

## 2020-02-29 PROCEDURE — 63600175 PHARM REV CODE 636 W HCPCS: Performed by: ANESTHESIOLOGY

## 2020-02-29 PROCEDURE — 80048 BASIC METABOLIC PNL TOTAL CA: CPT

## 2020-02-29 PROCEDURE — 99239 HOSP IP/OBS DSCHRG MGMT >30: CPT | Mod: ,,, | Performed by: INTERNAL MEDICINE

## 2020-02-29 PROCEDURE — 36415 COLL VENOUS BLD VENIPUNCTURE: CPT

## 2020-02-29 PROCEDURE — 63600175 PHARM REV CODE 636 W HCPCS: Performed by: INTERNAL MEDICINE

## 2020-02-29 PROCEDURE — 25000003 PHARM REV CODE 250: Performed by: INTERNAL MEDICINE

## 2020-02-29 PROCEDURE — 25000003 PHARM REV CODE 250: Performed by: STUDENT IN AN ORGANIZED HEALTH CARE EDUCATION/TRAINING PROGRAM

## 2020-02-29 RX ORDER — METOPROLOL TARTRATE 25 MG/1
25 TABLET, FILM COATED ORAL 2 TIMES DAILY
Status: DISCONTINUED | OUTPATIENT
Start: 2020-02-29 | End: 2020-02-29 | Stop reason: HOSPADM

## 2020-02-29 RX ORDER — DOXYCYCLINE HYCLATE 100 MG
100 TABLET ORAL EVERY 12 HOURS
Qty: 10 TABLET | Refills: 0 | Status: SHIPPED | OUTPATIENT
Start: 2020-02-29 | End: 2020-07-27

## 2020-02-29 RX ADMIN — ACETAMINOPHEN 650 MG: 325 TABLET ORAL at 03:02

## 2020-02-29 RX ADMIN — AMLODIPINE BESYLATE 5 MG: 5 TABLET ORAL at 08:02

## 2020-02-29 RX ADMIN — INSULIN ASPART 4 UNITS: 100 INJECTION, SOLUTION INTRAVENOUS; SUBCUTANEOUS at 11:02

## 2020-02-29 RX ADMIN — CEPHALEXIN 500 MG: 250 CAPSULE ORAL at 12:02

## 2020-02-29 RX ADMIN — ACETAMINOPHEN 650 MG: 325 TABLET ORAL at 10:02

## 2020-02-29 RX ADMIN — METOPROLOL TARTRATE 25 MG: 25 TABLET ORAL at 10:02

## 2020-02-29 RX ADMIN — CEFAZOLIN 2 G: 1 INJECTION, POWDER, FOR SOLUTION INTRAMUSCULAR; INTRAVENOUS at 04:02

## 2020-02-29 RX ADMIN — HYDROMORPHONE HYDROCHLORIDE 0.2 MG: 1 INJECTION, SOLUTION INTRAMUSCULAR; INTRAVENOUS; SUBCUTANEOUS at 04:02

## 2020-02-29 RX ADMIN — INSULIN ASPART 8 UNITS: 100 INJECTION, SOLUTION INTRAVENOUS; SUBCUTANEOUS at 07:02

## 2020-02-29 RX ADMIN — ATORVASTATIN CALCIUM 20 MG: 20 TABLET, FILM COATED ORAL at 08:02

## 2020-02-29 NOTE — DISCHARGE INSTRUCTIONS
Sling to left arm - wear for 24 hours, then only at night for 6 weeks.  NO HEPARIN PRODUCTS  Doxy 100 mg twice a day for 5 days at discharge  No lifting left elbow above shoulder height  No lifting over 5 pounds  No driving for 1 week and for 4 weeks if patient uses left arm to make turns  Patient may shower in 24 hours, do not let beam of shower hit site directly and no scrubbing in area  RESTART ELIQUIS 3/5/20  Follow up in device clinic in 1 week and with Dr Kan in 4 months.    -Follow-up on Endocrinology and Allergy referrals

## 2020-02-29 NOTE — HOSPITAL COURSE
Patient was admitted to CCU for asymptomatic bradycardia on 2/24 and found to be in Afib with RVR. She was started on therapeutic Lovenox as she was not on any prior anticoagulation. EP was consulted for possible pacemaker placement and initially did not advise pacemaker placement with trial of metoprolol 25mg BID as well as initiation of 30 day event monitor and DOAC (Apixiban) therapy. 2D ECHO and nuclear stress test ordered as patient has many risk factors (DMII, smoker, HTN) for CAD. Studies were unremarkable. She was discharged on 2/26/20 but was readmitted on 2/28/20 after 6 second pause was noted on event monitor. Returned to CCU with subsequent pacemaker placement on 2/28/20. Eliquis held for procedure and can be re-started on 3/5/20. Tolerated procedure well. Will discharge home with doxycycline 100 mg BID for 5 days with follow-up with device clinic in 1 week and with Dr. Kan in 3 months.

## 2020-02-29 NOTE — ASSESSMENT & PLAN NOTE
- One episode of atrial fibrillation on last admission, resolved spontaneously  - On Eliquis, held for procedure, re-start in 5 days per EP  - Resume metoprolol XL 25 mg

## 2020-02-29 NOTE — ASSESSMENT & PLAN NOTE
S/p DC Biotronik PPM placed 2/28/20    - doxycycline 100mg BID x 5 days  - lifting restrictions: no lifting left elbow above shoulder height, no lifting over 5 pounds  - no driving for 1 week and 4 weeks if patient uses left am ro make turns  - follow up with device clinic in 1 week and with Dr. Kan in 3 months

## 2020-02-29 NOTE — PLAN OF CARE
Reviewed with pt. The current plan of care regarding the scheduled medication regimen, plan management and the care of the new pace marker insertion site. Pt. Verbalized undertanding of information given

## 2020-02-29 NOTE — DISCHARGE SUMMARY
[facility]  Cardiology  Discharge Summary      Patient Name: Laura Lee  MRN: [id number]  Admission Date: 2/28/2020  Hospital Length of Stay: 1 days  Discharge Date and Time:  02/29/2020 12:04 PM  Attending Physician: Maximino Merino MD    Discharging Provider: Apoorva Valdivia MD  Primary Care Physician: Les Bryant MD    HPI:   49 y.o. F with T2DM, HTN, newly diagnosed atrial fibrillation on Eliquis and metoprolol. Patient was recently discharged on 2/26 after being admitted for sinus bradycardia, asymptomatic at that time. She was found to have atrial fibrillation that spontaneously converted to NSR. She was started on Eliquis and left with a 30 day event monitor. Last night, she was noted to have a 2.6 and 6 second pause per EP fellow's note. She reported symptoms at that time and was asked to presented to her local ED. She is transferred to [facility] for PPM implantation. This morning reports feeling well. No other issues.     Procedure(s) (LRB):  INSERTION, CARDIAC PACEMAKER, DUAL CHAMBER (N/A)     Indwelling Lines/Drains at time of discharge:  Lines/Drains/Airways     None                 Hospital Course:  Patient was admitted to CCU for asymptomatic bradycardia on 2/24 and found to be in Afib with RVR. She was started on therapeutic Lovenox as she was not on any prior anticoagulation. EP was consulted for possible pacemaker placement and initially did not advise pacemaker placement with trial of metoprolol 25mg BID as well as initiation of 30 day event monitor and DOAC (Apixiban) therapy. 2D ECHO and nuclear stress test ordered as patient has many risk factors (DMII, smoker, HTN) for CAD. Studies were unremarkable. She was discharged on 2/26/20 but was readmitted on 2/28/20 after 6 second pause was noted on event monitor. Returned to CCU with subsequent pacemaker placement on 2/28/20. Eliquis held for procedure and can be re-started on 3/5/20. Tolerated procedure well. Will discharge  home with doxycycline 100 mg BID for 5 days with follow-up with device clinic in 1 week and with Dr. aKn in 3 months.     Review of Systems   Constitutional: Negative for chills, diaphoresis, fever and malaise/fatigue.   Respiratory: Negative for cough and shortness of breath.    Cardiovascular: Negative for chest pain and palpitations.   Gastrointestinal: Negative for abdominal pain.   Genitourinary: Negative for dysuria.   Musculoskeletal: Negative for falls.   Neurological: Negative for dizziness and focal weakness.     Physical Exam   Constitutional: She is oriented to person, place, and time and well-developed, well-nourished, and in no distress. No distress.   HENT:   Head: Normocephalic and atraumatic.   Eyes: Conjunctivae and EOM are normal. No scleral icterus.   Neck: Normal range of motion. Neck supple.   Cardiovascular: Normal rate, regular rhythm and normal heart sounds.   Pulmonary/Chest: Effort normal and breath sounds normal.   Abdominal: Soft.   Musculoskeletal: Normal range of motion. She exhibits no edema or tenderness.        Arms:  Neurological: She is alert and oriented to person, place, and time. Gait normal. GCS score is 15.   Skin: Skin is warm and dry. She is not diaphoretic.       Consults:   Consults (From admission, onward)        Status Ordering Provider     Inpatient consult to Electrophysiology  Once     Provider:  (Not yet assigned)    Completed SHEY MAURO          Significant Diagnostic Studies: Labs:   CMP   Recent Labs   Lab 02/29/20  0420      K 4.3      CO2 25   *   BUN 14   CREATININE 1.1   CALCIUM 9.3   ANIONGAP 11   ESTGFRAFRICA >60.0   EGFRNONAA 58.3*   , CBC   Recent Labs   Lab 02/28/20  0825   WBC 7.78   HGB 14.9   HCT 43.6       and INR   Lab Results   Component Value Date    INR 1.0 02/28/2020    INR 1.0 02/24/2020     Radiology:       Final Active Diagnoses:    Diagnosis Date Noted POA    PRINCIPAL PROBLEM:  Symptomatic sinus bradycardia  [R00.1] 02/24/2020 Yes    Paroxysmal A-fib [I48.0] 02/28/2020 Yes    Sinus pause [I45.5] 02/28/2020 Yes    Bradycardia [R00.1] 02/28/2020 Yes    Essential hypertension [I10] 02/25/2020 Yes    T2DM (type 2 diabetes mellitus) [E11.9] 02/24/2020 Yes      Problems Resolved During this Admission:     Bradycardia  S/p DC Biotronik PPM placed 2/28/20    - doxycycline 100mg BID x 5 days  - lifting restrictions: no lifting left elbow above shoulder height, no lifting over 5 pounds  - no driving for 1 week and 4 weeks if patient uses left am ro make turns  - follow up with device clinic in 1 week and with Dr. Kan in 3 months         Paroxysmal A-fib  - One episode of atrial fibrillation on last admission, resolved spontaneously  - On Eliquis, held for procedure, re-start in 5 days per EP  - Resume metoprolol XL 25 mg         Discharged Condition: stable    Disposition: Home or Self Care    Follow Up:  Follow-up Information     [facility] - Arrhythmia In 7 days.    Specialty:  Cardiology  Why:  biotronik dual chamber ppm device and wound check  Contact information:  [address] [place] [id number] [phone]  Additional information:  [facility] [place] - 3rd Floor           Everardo Kan MD In 4 months.    Specialties:  Electrophysiology, Cardiology  Contact information:  [address] [place] [id number] [phone]                 Patient Instructions:      Ambulatory referral/consult to Endocrinology   Standing Status: Future   Referral Priority: Routine Referral Type: Consultation   Requested Specialty: Endocrinology   Number of Visits Requested: 1     Diet diabetic     Other restrictions (specify):     Notify your health care provider if you experience any of the following:  temperature >100.4     Notify your health care provider if you experience any of the following:  severe uncontrolled pain     Notify your health care provider if you experience any of the following:  redness,  tenderness, or signs of infection (pain, swelling, redness, odor or green/yellow discharge around incision site)     Notify your health care provider if you experience any of the following:  difficulty breathing or increased cough     Notify your health care provider if you experience any of the following:  persistent dizziness, light-headedness, or visual disturbances     Medications:  Reconciled Home Medications:      Medication List      START taking these medications    doxycycline 100 MG EC tablet  Commonly known as:  DORYX  Take 1 tablet (100 mg total) by mouth every 12 (twelve) hours. For 5 days        CHANGE how you take these medications    apixaban 5 mg Tab  Commonly known as:  ELIQUIS  Take 1 tablet (5 mg total) by mouth 2 (two) times daily.  Start taking on:  March 5, 2020  What changed:  These instructions start on March 5, 2020. If you are unsure what to do until then, ask your doctor or other care provider.        CONTINUE taking these medications    amitriptyline 25 MG tablet  Commonly known as:  ELAVIL  Take 25 mg by mouth every evening.     amLODIPine 5 MG tablet  Commonly known as:  NORVASC  Take 1 tablet (5 mg total) by mouth once daily.     atorvastatin 20 MG tablet  Commonly known as:  LIPITOR  Take 20 mg by mouth once daily.     estradiol 1 MG tablet  Commonly known as:  ESTRACE  Take 1 tablet (1 mg total) by mouth once daily.     medroxyPROGESTERone 10 MG tablet  Commonly known as:  PROVERA  Take 1 tablet (10 mg total) by mouth once daily.     metFORMIN 500 MG XR 24hr tablet  Commonly known as:  GLUCOPHAGE-XR  Take 2 tablets by mouth once daily.     metoprolol tartrate 25 MG tablet  Commonly known as:  LOPRESSOR  Take 1 tablet (25 mg total) by mouth 2 (two) times daily.            Time spent on the discharge of patient: 35 minutes    Apoorva Valdivia MD  Cardiology  [facility]

## 2020-02-29 NOTE — NURSING
Patient is ready for discharge. Patient stable alert and oriented. IVs removed. No complaints of pain. Discussed discharge plan. Reviewed medications and side effects, appointments, and answered questions with patient and family.  RX given to patient by bedside delivery. Pacemaker card and care instructions given to pt. Sling in place. AVS printed and reviewed with pt.

## 2020-02-29 NOTE — ASSESSMENT & PLAN NOTE
Laura Lee is a 49 y.o. F with T2DM, HTN, who was transferred from OSH for further evaluation of braydcardia. 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 She presented to the ER, where she was observed overnight and discharged the next day. Daughter reports that after went home, patient returned to the ER because symptoms had not resolved. At this point, they were given IV benadryl, steroids and epinepherine, which did improve her symptoms.   Pt was noted to incidentally develop bradycardia overnight on 2/23/20. She denies any chest pain or shortness of breath at that time. She was transferred her for further evaluation.   - TTE 2/26/20: EF 55%  - s/p DC Biotronik PPM placed   - doxycycline 100mg BID x 5 days  - no lifting   - follow up with device clinic in 1 week and with Dr. Kan in 3 months

## 2020-02-29 NOTE — PROGRESS NOTES
[facility]  Cardiac Electrophysiology  Progress Note    Admission Date: 2/28/2020  Code Status: Full Code   Attending Physician: Maximino Merino MD   Expected Discharge Date:   Principal Problem:Symptomatic sinus bradycardia    Subjective:     Interval History: no acute events overnight. Pt had DC Biotronik PPM placed 2/28/20. No sinus pauses on tele. Pressure dressing was removed this morning and site appeared dry, without swelling.   CXR reviewed. Repeat PA and lateral CXR pending    Review of Systems   Constitution: Negative for chills and fever.   Cardiovascular: Negative for chest pain, dyspnea on exertion, irregular heartbeat, near-syncope and syncope.   Respiratory: Negative for shortness of breath.    Gastrointestinal: Negative for nausea.   Neurological: Negative for headaches and weakness.     Objective:     Vital Signs (Most Recent):  Temp: 97.2 °F (36.2 °C) (02/29/20 0731)  Pulse: 85 (02/29/20 0731)  Resp: 14 (02/29/20 0731)  BP: (!) 140/82 (02/29/20 0731)  SpO2: 95 % (02/29/20 0731) Vital Signs (24h Range):  Temp:  [97 °F (36.1 °C)-98.8 °F (37.1 °C)] 97.2 °F (36.2 °C)  Pulse:  [] 85  Resp:  [13-18] 14  SpO2:  [92 %-97 %] 95 %  BP: (110-143)/(62-82) 140/82     Weight: 100 kg (220 lb 7.4 oz)  Body mass index is 39.05 kg/m².     SpO2: 95 %  O2 Device (Oxygen Therapy): room air    Physical Exam   Constitutional: She is oriented to person, place, and time. She appears well-developed and well-nourished.   HENT:   Head: Normocephalic and atraumatic.   Eyes: Pupils are equal, round, and reactive to light.   Neck: No JVD present.   Cardiovascular: Normal rate and regular rhythm.   No murmur heard.  Pulmonary/Chest: Effort normal and breath sounds normal. No respiratory distress.   Abdominal: Soft. Bowel sounds are normal. She exhibits no distension. There is no tenderness.   Musculoskeletal: She exhibits no edema.   Neurological: She is alert and oriented to person, place, and  time.   Skin: Skin is warm and dry. No erythema.   Psychiatric: Her behavior is normal. Judgment and thought content normal.   Vitals reviewed.      Significant Labs:   CMP:   Recent Labs   Lab 02/29/20  0420      K 4.3      CO2 25   *   BUN 14   CREATININE 1.1   CALCIUM 9.3   ANIONGAP 11   ESTGFRAFRICA >60.0   EGFRNONAA 58.3*    and CBC:   Recent Labs   Lab 02/28/20  0825   WBC 7.78   HGB 14.9   HCT 43.6          Significant Imaging: CXR, tele reviewed    Assessment and Plan:   Laura Lee is a 49 y.o. F with T2DM, HTN, who was transferred from OSH for further evaluation of braydcardia. 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 She presented to the ER, where she was observed overnight and discharged the next day. Daughter reports that after went home, patient returned to the ER because symptoms had not resolved. At this point, they were given IV benadryl, steroids and epinepherine, which did improve her symptoms.   Pt was noted to incidentally develop bradycardia overnight on 2/23/20. She denies any chest pain or shortness of breath at that time. She was transferred her for further evaluation.     * Symptomatic sinus pause  - TTE 2/26/20: EF 55%  - s/p DC Biotronik PPM placed 2/28/20. CXR PA/lateral pending   - doxycycline 100mg BID x 5 days  - lifting restrictions: no lifting left elbow above shoulder height, no lifting over 5 pounds  - no driving for 1 week and 4 weeks if patient uses left am ro make turns  - follow up with device clinic in 1 week and with Dr. Kan in 3 months    Paroxysmal A-fib  - resume elliquis after 5 days post-procedure. Estimated starting date 3/5/20  - resumed metoprolol 25mg BID    Patient seen and plan of care discussed with Dr. Kan.    Amee Matias MD  Cardiac Electrophysiology  [facility]

## 2020-03-02 NOTE — ANESTHESIA POSTPROCEDURE EVALUATION
Anesthesia Post Evaluation    Patient: Laura Lee    Procedure(s) Performed: Procedure(s) (LRB):  INSERTION, CARDIAC PACEMAKER, DUAL CHAMBER (N/A)    Final Anesthesia Type: general    Patient location during evaluation: PACU  Patient participation: Yes- Able to Participate  Level of consciousness: awake and alert  Post-procedure vital signs: reviewed and stable  Pain management: adequate  Airway patency: patent    PONV status at discharge: No PONV  Anesthetic complications: no      Cardiovascular status: blood pressure returned to baseline  Respiratory status: unassisted  Follow-up not needed.          Vitals Value Taken Time   /68 2/29/2020  3:16 PM   Temp 36.2 °C (97.2 °F) 2/29/2020  3:16 PM   Pulse 74 2/29/2020  3:16 PM   Resp 18 2/29/2020  3:16 PM   SpO2 97 % 2/29/2020  3:16 PM         No case tracking events are documented in the log.      Pain/Fiona Score: No data recorded

## 2020-03-02 NOTE — PLAN OF CARE
03/01/20 1917   Final Note   Assessment Type Final Discharge Note   Anticipated Discharge Disposition Home   Hospital Follow Up  Appt(s) scheduled? Yes

## 2020-03-06 ENCOUNTER — CLINICAL SUPPORT (OUTPATIENT)
Dept: CARDIOLOGY | Facility: HOSPITAL | Age: 50
End: 2020-03-06
Attending: INTERNAL MEDICINE
Payer: MEDICAID

## 2020-03-06 ENCOUNTER — TELEPHONE (OUTPATIENT)
Dept: ELECTROPHYSIOLOGY | Facility: CLINIC | Age: 50
End: 2020-03-06

## 2020-03-06 DIAGNOSIS — Z95.0 CARDIAC PACEMAKER: ICD-10-CM

## 2020-03-06 PROCEDURE — 93280 PM DEVICE PROGR EVAL DUAL: CPT

## 2020-03-06 NOTE — TELEPHONE ENCOUNTER
Spoke with Bre who said she would have the pt give us a call to schedule her 3m f/u with Margy following her PPM implant.

## 2020-04-21 ENCOUNTER — TELEPHONE (OUTPATIENT)
Dept: CARDIOLOGY | Facility: HOSPITAL | Age: 50
End: 2020-04-21

## 2020-04-21 NOTE — TELEPHONE ENCOUNTER
"Pt contacted [facility] on this afternoon and the call was transferred to me.  The pt was upset and crying.  Was able to talk to her and calm her down to try to find out what she needed. Pt stated she was having some pain in her left shoulder and had a pacemaker implanted on 2/28/20 and is concerned because she has not had any help with showering, getting dressed, doing small things around the house since being discharged from the hospital.  Confirmed with the pt there was no swelling, redness, broken skin or drainage at the site. The pt then stated it feels like, "a part of the pacemaker is not sitting in the way I think it's supposed to." The pt then acknowledged that she has anxiety and is just feeling overwhelmed with having to get a pacemaker and the Coronavirus and what she should and shouldn't do.  After answering her general questions the pt then stated that she has established care with Dr. Quincy Hurtado with [facility] and that they instructed her to contact this office to release her in the [employer] remote monitoring website. Per pt's request she was deactivated and was instructed to contact Dr. Hurtado's office to inform them of how she is feeling as well as they can now register her to their clinic.  After speaking or listening to the pt she felt much more comfortable and expressed appreciation for my just listening and being able to assist her in know what she needed to do.  Reminded the pt to contact Dr. Hurtado's office upon the completion of our call.  Pt verbalized understanding to all of the above.     "

## 2020-05-13 ENCOUNTER — TELEPHONE (OUTPATIENT)
Dept: ELECTROPHYSIOLOGY | Facility: CLINIC | Age: 50
End: 2020-05-13

## 2020-05-13 NOTE — TELEPHONE ENCOUNTER
Returned pt call but was unable to reach/lvm .      ----- Message from Alexa Galaviz sent at 5/13/2020  7:20 AM CDT -----  Contact: Pt   Pt requesting to reschedule appt from 4/23/2020    Please call and advise    Phone [phone]

## 2020-06-03 ENCOUNTER — TELEPHONE (OUTPATIENT)
Dept: CARDIOLOGY | Facility: HOSPITAL | Age: 50
End: 2020-06-03

## 2020-06-03 NOTE — TELEPHONE ENCOUNTER
Spoke with patient and scheduled her to come in to the office to have her device checked on June 8th at 11:00 AM and a follow up visit with Marilynn Perdomo, the NP after.   ----- Message from Remy Holman MA sent at 6/3/2020  4:26 PM CDT -----  Contact: self      ----- Message -----  From: Amina Sidhu MA  Sent: 6/3/2020   3:33 PM CDT  To: Lucius GARCIA Staff    Pt had a pacemaker insert on 2/28 by . 3 1/2 weeks after  insert she's been having severe pain in shoulder,neck and collar bone and pain shooting  to  left arm. She doesn't know if this pain has anything to do  with insert. Please call.

## 2020-06-08 PROBLEM — Z95.0 CARDIAC PACEMAKER IN SITU: Status: ACTIVE | Noted: 2020-06-08

## 2020-06-08 NOTE — PROGRESS NOTES
Ms. Lee is a patient of Dr. Kan.       Subjective:   Patient ID:  Laura Lee is a 49 y.o. female who presents for follow-up of Pacemaker Check and Atrial Fibrillation  .     HPI:    Ms. Lee is a 49 y.o. female with pAF, GERD, HTN, HLD, DM, AVB here for follow up after pacemaker implantation.     Background:    Patient presented to hospital 2/24/2020 after allergic reaction to working with mold and bleach. While in hospital, presented with intermittent asymptomatic sinus bradycardia and newly discovered pAF. She was asymptomatic during the AF episode. Initiated DOAC therapy and change atenolol to metoprolol 25mg bid.   Was discharged home with 30 day monitor. Patient had a symptomatic episode of AV block with 6 second pause in AV conduction. This proceeded with TX prolongation, sinus rate slowing then complete AV block consistent with a vasovagal event. No obvious trigger per patient.  EF normal per echo 2/26/2020.  2/28/2020: dual chamber permanent pacemaker implant.     Update (06/10/2020):    Today she reports left arm pain for about 3 weeks. Pain is worse when she tries to lift her arm. Denies swelling. Says this has been evaluated by cardiology near her house but no ultrasound performed. Has chronic DAVIDSON. Denies palpitations, CP, LH, syncope. BPs normally better controlled but she says is elevated secondary to pain.    She is currently taking eliquis 5mg BID for stroke prophylaxis and denies significant bleeding episodes. She is currently being treated with metoprolol tartrate 25mg BID for HR control.  Kidney function is stable, with a creatinine of 1.1 on 2/29/220.    Device Interrogation (6/10/2020) reveals an intrinsic SR with stable lead and device function. No arrhythmias or treated episodes were noted. AMSx4, c/w ST.  She paces 33% in the RA and 33% in the RV. Estimated battery longevity 9 years.     I have personally reviewed the patient's EKG today, which shows intermittent APVS at 76bpm. TX  interval is 204. QRS is 70. QTc is 416.    Recent Cardiac Tests:    2D Echo (2/26/2020):  · Normal left ventricular systolic function. The estimated ejection fraction is 55%.  · Normal LV diastolic function.  · Normal right ventricular systolic function.  · Normal central venous pressure (3 mmHg).     Current Outpatient Medications   Medication Sig    amitriptyline (ELAVIL) 25 MG tablet Take 25 mg by mouth every evening.     amLODIPine (NORVASC) 5 MG tablet Take 1 tablet (5 mg total) by mouth once daily.    apixaban (ELIQUIS) 5 mg Tab Take 1 tablet (5 mg total) by mouth 2 (two) times daily.    atorvastatin (LIPITOR) 20 MG tablet Take 20 mg by mouth once daily.     doxycycline (VIBRA-TABS) 100 MG tablet Take 1 tablet (100 mg total) by mouth every 12 (twelve) hours.    estradiol (ESTRACE) 1 MG tablet Take 1 tablet (1 mg total) by mouth once daily.    medroxyPROGESTERone (PROVERA) 10 MG tablet Take 1 tablet (10 mg total) by mouth once daily.    metFORMIN (GLUCOPHAGE-XR) 500 MG 24 hr tablet Take 2 tablets by mouth once daily.    metoprolol tartrate (LOPRESSOR) 25 MG tablet Take 1 tablet (25 mg total) by mouth 2 (two) times daily.     No current facility-administered medications for this visit.      Review of Systems   Constitution: Negative for malaise/fatigue.   Cardiovascular: Positive for dyspnea on exertion. Negative for chest pain, irregular heartbeat, leg swelling and palpitations.   Respiratory: Positive for sleep disturbances due to breathing and snoring. Negative for shortness of breath.    Hematologic/Lymphatic: Negative for bleeding problem.   Skin: Negative for rash.   Musculoskeletal: Positive for joint pain. Negative for myalgias.   Gastrointestinal: Negative for hematemesis, hematochezia and nausea.   Genitourinary: Negative for hematuria.   Neurological: Positive for excessive daytime sleepiness. Negative for light-headedness.   Psychiatric/Behavioral: Negative for altered mental status.    Allergic/Immunologic: Negative for persistent infections.     Objective:        BP (!) 166/92   Pulse 76   LMP 01/09/2018 (Within Months)     Physical Exam   Constitutional: She is oriented to person, place, and time. She appears well-developed and well-nourished.   HENT:   Head: Normocephalic.   Nose: Nose normal.   Eyes: Pupils are equal, round, and reactive to light.   Cardiovascular: Normal rate, regular rhythm, S1 normal and S2 normal.   No murmur heard.  Pulses:       Radial pulses are 2+ on the right side, and 2+ on the left side.   Pulmonary/Chest: Breath sounds normal. No respiratory distress.   Device to LUCW. Incision well healed. Pocket in good repair.   Abdominal: Normal appearance.   Musculoskeletal: Normal range of motion. She exhibits no edema.   Neurological: She is alert and oriented to person, place, and time.   Skin: Skin is warm and dry. No erythema.   Psychiatric: She has a normal mood and affect. Her speech is normal and behavior is normal.   Nursing note and vitals reviewed.    Lab Results   Component Value Date     02/29/2020    K 4.3 02/29/2020    MG 1.3 (L) 02/26/2020    BUN 14 02/29/2020    CREATININE 1.1 02/29/2020    HGB 14.9 02/28/2020    HCT 43.6 02/28/2020    LDLCALC 60.2 (L) 02/25/2020       Recent Labs   Lab 02/24/20 2005 02/28/20  0825   INR 1.0 1.0       Assessment:     1. Cardiac pacemaker in situ    2. Paroxysmal A-fib    3. Sinus pause    4. Symptomatic sinus bradycardia    5. Essential hypertension    6. Adhesive capsulitis of left shoulder    7. Left upper arm pain    8. Daytime somnolence      Plan:     In summary, Ms. Lee is a 49 y.o. female with pAF, GERD, HTN, HLD, DM, AVB here for follow up after pacemaker implantation.   She is 3.5 months s/p PPM implantation for sinus pause. Ms. Lee is doing well from a device perspective with stable lead and device function. No arrhythmia noted. Laura Lee's [id number] Score is 3 (SEX, HTN, DM) and anticoagulation  is recommended. She remains on eliquis for CVA prophylaxis. She has significant left upper arm and shoulder pain. No swelling. Will order ultrasound to rule out DVT although this is likely frozen shoulder. Will refer to orthopedics. She also has disordered sleep, snoring, and daytime somnolence. Will order sleep consult.    Ultrasound.  Orthopedics.  Sleep consult.  Continue current medication regimen and device settings.   Follow up in device clinic as scheduled.   Follow up in EP clinic in 6 months, sooner as needed.     *A copy of this note has been sent to Dr. Kan*    Follow up in about 6 months (around 12/10/2020).    ------------------------------------------------------------------    JOHN Garcia, NP-C  Cardiac Electrophysiology

## 2020-06-09 DIAGNOSIS — I49.8 OTHER SPECIFIED CARDIAC ARRHYTHMIAS: Primary | ICD-10-CM

## 2020-06-10 ENCOUNTER — HOSPITAL ENCOUNTER (OUTPATIENT)
Dept: CARDIOLOGY | Facility: CLINIC | Age: 50
Discharge: HOME | End: 2020-06-10
Payer: MEDICAID

## 2020-06-10 ENCOUNTER — OFFICE VISIT (OUTPATIENT)
Dept: ELECTROPHYSIOLOGY | Facility: CLINIC | Age: 50
End: 2020-06-10
Payer: MEDICAID

## 2020-06-10 ENCOUNTER — CLINICAL SUPPORT (OUTPATIENT)
Dept: CARDIOLOGY | Facility: HOSPITAL | Age: 50
End: 2020-06-10
Attending: INTERNAL MEDICINE
Payer: MEDICAID

## 2020-06-10 VITALS — DIASTOLIC BLOOD PRESSURE: 92 MMHG | HEART RATE: 76 BPM | SYSTOLIC BLOOD PRESSURE: 166 MMHG

## 2020-06-10 DIAGNOSIS — I48.0 PAROXYSMAL A-FIB: ICD-10-CM

## 2020-06-10 DIAGNOSIS — Z95.0 CARDIAC PACEMAKER IN SITU: Primary | ICD-10-CM

## 2020-06-10 DIAGNOSIS — R00.1 SYMPTOMATIC SINUS BRADYCARDIA: ICD-10-CM

## 2020-06-10 DIAGNOSIS — M79.622 LEFT UPPER ARM PAIN: ICD-10-CM

## 2020-06-10 DIAGNOSIS — M75.02 ADHESIVE CAPSULITIS OF LEFT SHOULDER: ICD-10-CM

## 2020-06-10 DIAGNOSIS — I10 ESSENTIAL HYPERTENSION: ICD-10-CM

## 2020-06-10 DIAGNOSIS — I49.8 OTHER SPECIFIED CARDIAC ARRHYTHMIAS: ICD-10-CM

## 2020-06-10 DIAGNOSIS — R40.0 DAYTIME SOMNOLENCE: ICD-10-CM

## 2020-06-10 DIAGNOSIS — Z95.0 CARDIAC PACEMAKER: ICD-10-CM

## 2020-06-10 DIAGNOSIS — I45.5 SINUS PAUSE: ICD-10-CM

## 2020-06-10 PROCEDURE — 93005 ELECTROCARDIOGRAM TRACING: CPT | Mod: PBBFAC | Performed by: INTERNAL MEDICINE

## 2020-06-10 PROCEDURE — 99999 PR PBB SHADOW E&M-EST. PATIENT-LVL III: ICD-10-PCS | Mod: PBBFAC,,, | Performed by: NURSE PRACTITIONER

## 2020-06-10 PROCEDURE — 93280 PM DEVICE PROGR EVAL DUAL: CPT

## 2020-06-10 PROCEDURE — 99214 PR OFFICE/OUTPT VISIT, EST, LEVL IV, 30-39 MIN: ICD-10-PCS | Mod: S$PBB,,, | Performed by: NURSE PRACTITIONER

## 2020-06-10 PROCEDURE — 93280 CARDIAC DEVICE CHECK - IN CLINIC & HOSPITAL: ICD-10-PCS | Mod: 26,,, | Performed by: INTERNAL MEDICINE

## 2020-06-10 PROCEDURE — 99214 OFFICE O/P EST MOD 30 MIN: CPT | Mod: S$PBB,,, | Performed by: NURSE PRACTITIONER

## 2020-06-10 PROCEDURE — 99213 OFFICE O/P EST LOW 20 MIN: CPT | Mod: PBBFAC,25 | Performed by: NURSE PRACTITIONER

## 2020-06-10 PROCEDURE — 93010 ELECTROCARDIOGRAM REPORT: CPT | Mod: S$PBB,,, | Performed by: INTERNAL MEDICINE

## 2020-06-10 PROCEDURE — 93280 PM DEVICE PROGR EVAL DUAL: CPT | Mod: 26,,, | Performed by: INTERNAL MEDICINE

## 2020-06-10 PROCEDURE — 99999 PR PBB SHADOW E&M-EST. PATIENT-LVL III: CPT | Mod: PBBFAC,,, | Performed by: NURSE PRACTITIONER

## 2020-06-10 PROCEDURE — 93010 RHYTHM STRIP: ICD-10-PCS | Mod: S$PBB,,, | Performed by: INTERNAL MEDICINE

## 2020-06-11 ENCOUNTER — TELEPHONE (OUTPATIENT)
Dept: SLEEP MEDICINE | Facility: CLINIC | Age: 50
End: 2020-06-11

## 2020-06-18 ENCOUNTER — TELEPHONE (OUTPATIENT)
Dept: CARDIOLOGY | Facility: HOSPITAL | Age: 50
End: 2020-06-18

## 2020-06-18 ENCOUNTER — TELEPHONE (OUTPATIENT)
Dept: ELECTROPHYSIOLOGY | Facility: CLINIC | Age: 50
End: 2020-06-18

## 2020-06-18 NOTE — TELEPHONE ENCOUNTER
Attempted to contact patient regarding [employer] home monitoring. She was here on 6/10 to see Marilynn Perdomo NP and had ICD interrogated but website has her being followed by someone else. I [facility] for her to return my call.

## 2020-06-18 NOTE — TELEPHONE ENCOUNTER
I returned patient's call. She stated that she is being followed remotely by Dr. Hurtado but would like to continue to follow here yearly. Her sleep study has been scheduled but the US to her arm needs to be done. Message sent to Marilynn Perdomo' staff to assist in this matter.       ----- Message from Amina Sidhu MA sent at 6/18/2020  2:52 PM CDT -----  Contact: self  Pt is returning  your call. Please call [phone].

## 2020-07-23 ENCOUNTER — TELEPHONE (OUTPATIENT)
Dept: ORTHOPEDICS | Facility: CLINIC | Age: 50
End: 2020-07-23

## 2020-07-23 NOTE — TELEPHONE ENCOUNTER
Patient requested to come in a little later, patients appointment was moved down to 10:30, patient verbalized understanding

## 2020-07-23 NOTE — TELEPHONE ENCOUNTER
----- Message from Jama Ferrera sent at 7/23/2020  2:57 PM CDT -----  Regarding: call back        The Pt would like a call back for a few questions she has.  The Pt would also like to see if you have a slightly later appt time for tomorrow then the 9:00 am time she has.  If not, she will keep her appt.    Phone # [phone]

## 2020-07-27 ENCOUNTER — OFFICE VISIT (OUTPATIENT)
Dept: ORTHOPEDICS | Facility: CLINIC | Age: 50
End: 2020-07-27
Payer: MEDICAID

## 2020-07-27 ENCOUNTER — TELEPHONE (OUTPATIENT)
Dept: ORTHOPEDICS | Facility: CLINIC | Age: 50
End: 2020-07-27

## 2020-07-27 VITALS
WEIGHT: 223.31 LBS | OXYGEN SATURATION: 99 % | HEIGHT: 63 IN | SYSTOLIC BLOOD PRESSURE: 126 MMHG | RESPIRATION RATE: 18 BRPM | BODY MASS INDEX: 39.57 KG/M2 | DIASTOLIC BLOOD PRESSURE: 81 MMHG | HEART RATE: 76 BPM

## 2020-07-27 DIAGNOSIS — M75.02 ADHESIVE CAPSULITIS OF LEFT SHOULDER: Primary | ICD-10-CM

## 2020-07-27 DIAGNOSIS — M54.12 CERVICAL RADICULITIS: ICD-10-CM

## 2020-07-27 PROCEDURE — 20610 LARGE JOINT ASPIRATION/INJECTION: L SUBACROMIAL BURSA: ICD-10-PCS | Mod: S$PBB,LT,, | Performed by: ORTHOPAEDIC SURGERY

## 2020-07-27 PROCEDURE — 20610 DRAIN/INJ JOINT/BURSA W/O US: CPT | Mod: PBBFAC,PN | Performed by: ORTHOPAEDIC SURGERY

## 2020-07-27 PROCEDURE — 99999 PR PBB SHADOW E&M-EST. PATIENT-LVL V: ICD-10-PCS | Mod: PBBFAC,,, | Performed by: ORTHOPAEDIC SURGERY

## 2020-07-27 PROCEDURE — 99203 OFFICE O/P NEW LOW 30 MIN: CPT | Mod: S$PBB,25,, | Performed by: ORTHOPAEDIC SURGERY

## 2020-07-27 PROCEDURE — 99999 PR PBB SHADOW E&M-EST. PATIENT-LVL V: CPT | Mod: PBBFAC,,, | Performed by: ORTHOPAEDIC SURGERY

## 2020-07-27 PROCEDURE — 99203 PR OFFICE/OUTPT VISIT, NEW, LEVL III, 30-44 MIN: ICD-10-PCS | Mod: S$PBB,25,, | Performed by: ORTHOPAEDIC SURGERY

## 2020-07-27 PROCEDURE — 99215 OFFICE O/P EST HI 40 MIN: CPT | Mod: PBBFAC,PN,25 | Performed by: ORTHOPAEDIC SURGERY

## 2020-07-27 RX ORDER — TRIAMCINOLONE ACETONIDE 40 MG/ML
40 INJECTION, SUSPENSION INTRA-ARTICULAR; INTRAMUSCULAR
Status: DISCONTINUED | OUTPATIENT
Start: 2020-07-27 | End: 2020-07-28 | Stop reason: HOSPADM

## 2020-07-27 RX ORDER — DICLOFENAC SODIUM 75 MG/1
75 TABLET, DELAYED RELEASE ORAL 2 TIMES DAILY
Qty: 60 TABLET | Refills: 1 | Status: SHIPPED | OUTPATIENT
Start: 2020-07-27 | End: 2020-07-27 | Stop reason: CLARIF

## 2020-07-27 RX ADMIN — TRIAMCINOLONE ACETONIDE 40 MG: 40 INJECTION, SUSPENSION INTRA-ARTICULAR; INTRAMUSCULAR at 03:07

## 2020-07-27 NOTE — PROCEDURES
Large Joint Aspiration/Injection: L subacromial bursa    Date/Time: 7/27/2020 3:30 PM  Performed by: Cristian Lawson MD  Authorized by: Cristian Lawson MD     Consent Done?:  Yes (Verbal)  Indications:  Pain  Timeout: prior to procedure the correct patient, procedure, and site was verified    Prep: patient was prepped and draped in usual sterile fashion    Local anesthesia used?: No      Details:  Needle Size:  22 G  Ultrasonic Guidance for needle placement?: No    Approach:  Posterior  Location:  Shoulder  Site:  L subacromial bursa  Medications:  40 mg triamcinolone acetonide 40 mg/mL  Patient tolerance:  Patient tolerated the procedure well with no immediate complications

## 2020-07-27 NOTE — LETTER
July 27, 2020      Marilynn Perdomo, NP  [address] [facility] [address]           [facility]  Orthopedics  [address] [facility] [address]  [facility] [address]  Phone: [phone]  Fax: [phone]          Patient: Laura Lee   MR Number: [id number]   YOB: 1970   Date of Visit: 7/27/2020       Dear Marilynn Perdomo:    Thank you for referring Laura Lee to me for evaluation. Attached you will find relevant portions of my assessment and plan of care.    If you have questions, please do not hesitate to call me. I look forward to following Laura Lee along with you.    Sincerely,    Cristian Lawson MD    Enclosure  CC:  No Recipients    If you would like to receive this communication electronically, please contact [email or web address] or [phone] to request more information on [facility] access.    For providers and/or their staff who would like to refer a patient to [facility], please contact us through our one-stop-shop provider referral line, [facility], [place], at [phone].    If you feel you have received this communication in error or would no longer like to receive these types of communications, please e-mail [email or web address]

## 2020-07-27 NOTE — TELEPHONE ENCOUNTER
----- Message from Henok Huggins sent at 7/27/2020  2:31 PM CDT -----  Contact: pt @ [phone]  Pt states she may be a few minutes late for appt at 3:30 PM today

## 2020-07-31 ENCOUNTER — TELEPHONE (OUTPATIENT)
Dept: ORTHOPEDICS | Facility: CLINIC | Age: 50
End: 2020-07-31

## 2020-07-31 NOTE — TELEPHONE ENCOUNTER
No answer. Message left to call clinic back.     Pt should not take diclofenac due to being on eliquis.

## 2020-08-03 ENCOUNTER — TELEPHONE (OUTPATIENT)
Dept: ORTHOPEDICS | Facility: CLINIC | Age: 50
End: 2020-08-03

## 2020-08-03 NOTE — TELEPHONE ENCOUNTER
Spoke with pt. Advised pt, per Dr Santillan, to stop taking the diclofenac die to being on eliquis. Also advised pt that I will be faxing her referral to pain management ([facility]) today. Pt verbalized understanding.

## 2020-08-03 NOTE — TELEPHONE ENCOUNTER
----- Message from Cheryl Owens sent at 8/3/2020 12:57 PM CDT -----  Contact: self @ [phone]  Pt says she is returning Tania's call.

## 2021-08-15 PROBLEM — R07.9 CHEST PAIN: Status: ACTIVE | Noted: 2021-08-15

## 2021-08-15 PROBLEM — N17.9 AKI (ACUTE KIDNEY INJURY): Status: ACTIVE | Noted: 2021-08-15

## 2021-08-15 PROBLEM — E11.10 DIABETIC KETOACIDOSIS WITHOUT COMA ASSOCIATED WITH TYPE 2 DIABETES MELLITUS: Status: ACTIVE | Noted: 2021-08-15

## 2021-08-15 PROBLEM — F19.10 POLYSUBSTANCE ABUSE: Status: ACTIVE | Noted: 2021-08-15

## 2021-08-16 PROBLEM — R07.9 CHEST PAIN: Status: RESOLVED | Noted: 2021-08-15 | Resolved: 2021-08-16

## 2021-08-18 PROBLEM — L02.91 ABSCESS: Status: ACTIVE | Noted: 2021-08-18

## 2021-08-18 PROBLEM — E11.10 DKA (DIABETIC KETOACIDOSES): Status: ACTIVE | Noted: 2021-08-18

## 2022-01-05 DIAGNOSIS — I49.8 OTHER SPECIFIED CARDIAC ARRHYTHMIAS: Primary | ICD-10-CM

## 2022-01-26 ENCOUNTER — TELEPHONE (OUTPATIENT)
Dept: ELECTROPHYSIOLOGY | Facility: CLINIC | Age: 52
End: 2022-01-26
Payer: MEDICAID

## 2022-04-01 ENCOUNTER — TELEPHONE (OUTPATIENT)
Dept: ELECTROPHYSIOLOGY | Facility: CLINIC | Age: 52
End: 2022-04-01
Payer: MEDICAID

## 2022-04-04 ENCOUNTER — TELEPHONE (OUTPATIENT)
Dept: ELECTROPHYSIOLOGY | Facility: CLINIC | Age: 52
End: 2022-04-04
Payer: MEDICAID

## 2022-05-23 NOTE — PROGRESS NOTES
Ms. Lee is a patient of Dr. Kan and was last seen in clinic 6/10/2020.      Subjective:   Patient ID:  Laura Lee is a 51 y.o. female who presents for follow-up of Pacemaker Check  .     HPI:    Ms. Lee is a 51 y.o. female with pAF, GERD, HTN, HLD, DM, AVB, PPM here for follow up.     Background:    Patient presented to hospital 2/24/2020 after allergic reaction to working with mold and bleach. While in hospital, presented with intermittent asymptomatic sinus bradycardia and newly discovered pAF. She was asymptomatic during the AF episode. Initiated DOAC therapy and change atenolol to metoprolol 25mg bid.   Was discharged home with 30 day monitor. Patient had a symptomatic episode of AV block with 6 second pause in AV conduction. This proceeded with KS prolongation, sinus rate slowing then complete AV block consistent with a vasovagal event. No obvious trigger per patient.  EF normal per echo 2/26/2020.  2/28/2020: dual chamber permanent pacemaker implant.     6/10/2020: She is 3.5 months s/p PPM implantation for sinus pause. Ms. Lee is doing well from a device perspective with stable lead and device function. No arrhythmia noted. Laura Lee's [id number] Score is 3 (SEX, HTN, DM) and anticoagulation is recommended. She remains on eliquis for CVA prophylaxis. She has significant left upper arm and shoulder pain. No swelling. Will order ultrasound to rule out DVT although this is likely frozen shoulder. Will refer to orthopedics. She also has disordered sleep, snoring, and daytime somnolence. Will order sleep consult.    Update (06/01/2022):    Today she says she has no new cardiac complaints. Some brief sharp CP not associated with exertion. Denies worsening DAVIDSON, palps, LH, syncope.    She is on ASA 81mg daily. She is currently being treated with metoprolol tartrate 25mg BID for HR control. Kidney function is stable, with a creatinine of 0.7 on 8/19/2021.    Device Interrogation (6/1/2022) reveals an  intrinsic SR with stable lead and device function. ATx5, max 3 minutes, all on 5/27/2022. Pt reports being in an argument at the time. No ventricular arrhythmias noted.  She paces 36% in the RA and 0% in the RV. Estimated battery longevity 6.11 years. Pt device being followed elsewhere and other events have been deleted.    I have personally reviewed the patient's EKG today, which shows APVS at 81bpm. IL interval is 248. QRS is 66. QTc is 413.    Recent Cardiac Tests:    2D Echo (2/26/2020):  · Normal left ventricular systolic function. The estimated ejection fraction is 55%.  · Normal LV diastolic function.  · Normal right ventricular systolic function.  · Normal central venous pressure (3 mmHg).    Current Outpatient Medications   Medication Sig    amitriptyline (ELAVIL) 25 MG tablet Take 25 mg by mouth every evening.     amLODIPine (NORVASC) 5 MG tablet Take 1 tablet (5 mg total) by mouth once daily.    blood sugar diagnostic Strp 1 strip by Misc.(Non-Drug; Combo Route) route 4 (four) times daily.    insulin detemir U-100 (LEVEMIR FLEXTOUCH U-100 INSULN) 100 unit/mL (3 mL) InPn pen Levemir FlexTouch U-100 Insulin 100 unit/mL (3 mL) subcutaneous pen    JARDIANCE 25 mg tablet Take 25 mg by mouth once daily.    linaGLIPtin (TRADJENTA) 5 mg Tab tablet Take 1 tablet (5 mg total) by mouth once daily.    lisinopriL (PRINIVIL,ZESTRIL) 5 MG tablet Take 1 tablet (5 mg total) by mouth once daily.    metoprolol tartrate (LOPRESSOR) 25 MG tablet Take 1 tablet (25 mg total) by mouth 2 (two) times daily.    pantoprazole (PROTONIX) 40 MG tablet Take 1 tablet (40 mg total) by mouth once daily.    apixaban (ELIQUIS) 5 mg Tab Take 1 tablet (5 mg total) by mouth 2 (two) times daily. (Patient not taking: Reported on 6/1/2022)    estradiol (ESTRACE) 1 MG tablet Take 1 tablet (1 mg total) by mouth once daily.     No current facility-administered medications for this visit.       Review of Systems   Constitutional: Negative  "for malaise/fatigue.   Cardiovascular: Negative for chest pain, dyspnea on exertion, irregular heartbeat, leg swelling and palpitations.   Respiratory: Negative for shortness of breath.    Hematologic/Lymphatic: Negative for bleeding problem.   Skin: Negative for rash.   Musculoskeletal: Negative for myalgias.   Gastrointestinal: Negative for hematemesis, hematochezia and nausea.   Genitourinary: Negative for hematuria.   Neurological: Negative for light-headedness.   Psychiatric/Behavioral: Negative for altered mental status.   Allergic/Immunologic: Negative for persistent infections.     Objective:        /65   Pulse 81   Ht 5' 3" (1.6 m)   Wt 104.7 kg (230 lb 13.2 oz)   LMP 01/09/2018 (Within Months)   BMI 40.89 kg/m²     Physical Exam  Vitals and nursing note reviewed.   Constitutional:       Appearance: Normal appearance. She is well-developed.   HENT:      Head: Normocephalic.      Nose: Nose normal.   Eyes:      Pupils: Pupils are equal, round, and reactive to light.   Cardiovascular:      Rate and Rhythm: Normal rate and regular rhythm.   Pulmonary:      Effort: No respiratory distress.      Breath sounds: Normal breath sounds.   Chest:      Comments: Device to LUCW. Incision and pocket in good repair.    Musculoskeletal:         General: Normal range of motion.   Skin:     General: Skin is warm and dry.      Findings: No erythema.   Neurological:      Mental Status: She is alert and oriented to person, place, and time.   Psychiatric:         Speech: Speech normal.         Behavior: Behavior normal.       Lab Results   Component Value Date     (L) 08/19/2021    K 3.5 08/19/2021    MG 1.5 (L) 08/19/2021    BUN 11 08/19/2021    CREATININE 0.7 08/19/2021    ALT 9 (L) 08/19/2021    AST 9 (L) 08/19/2021    HGB 15.0 08/19/2021    HCT 44.3 08/19/2021    TSH 0.389 (L) 08/15/2021    LDLCALC 60.2 (L) 02/25/2020       Recent Labs   Lab 02/24/20 2005 02/28/20  0825 08/16/21  0400   INR 1.0 1.0 1.1     "     Assessment:     1. Cardiac pacemaker in situ    2. Paroxysmal A-fib    3. Symptomatic sinus bradycardia    4. Essential hypertension      Plan:     In summary, Ms. Lee is a 51 y.o. female with pAF, GERD, HTN, HLD, DM, AVB, PPM here for follow up.   Ms. Lee is doing well from a device perspective with stable lead and device function. No sustained arrhythmia noted. Hx of pAF but she is no longer on eliquis. No RV pacing. She is monitored routinely at [facility] who also follows her remotely. She would like to continue routine follow at [facility] but return to [facility] for generator change.      Continue current medication regimen and device settings.   Follow up in device clinic as scheduled.   Follow up in EP clinic PRN     *A copy of this note has been sent to Dr. Kan*    Follow up PRN for generator change.    ------------------------------------------------------------------    JOHN Garcia, NP-C  Cardiac Electrophysiology

## 2022-06-01 ENCOUNTER — OFFICE VISIT (OUTPATIENT)
Dept: ELECTROPHYSIOLOGY | Facility: CLINIC | Age: 52
End: 2022-06-01
Payer: MEDICAID

## 2022-06-01 ENCOUNTER — CLINICAL SUPPORT (OUTPATIENT)
Dept: CARDIOLOGY | Facility: HOSPITAL | Age: 52
End: 2022-06-01
Attending: INTERNAL MEDICINE
Payer: MEDICAID

## 2022-06-01 VITALS
HEART RATE: 81 BPM | DIASTOLIC BLOOD PRESSURE: 65 MMHG | SYSTOLIC BLOOD PRESSURE: 127 MMHG | WEIGHT: 230.81 LBS | HEIGHT: 63 IN | BODY MASS INDEX: 40.89 KG/M2

## 2022-06-01 DIAGNOSIS — R00.1 SYMPTOMATIC SINUS BRADYCARDIA: ICD-10-CM

## 2022-06-01 DIAGNOSIS — I49.8 OTHER SPECIFIED CARDIAC ARRHYTHMIAS: ICD-10-CM

## 2022-06-01 DIAGNOSIS — Z95.0 CARDIAC PACEMAKER IN SITU: Primary | ICD-10-CM

## 2022-06-01 DIAGNOSIS — I10 ESSENTIAL HYPERTENSION: ICD-10-CM

## 2022-06-01 DIAGNOSIS — I48.0 PAROXYSMAL A-FIB: ICD-10-CM

## 2022-06-01 PROCEDURE — 99214 PR OFFICE/OUTPT VISIT, EST, LEVL IV, 30-39 MIN: ICD-10-PCS | Mod: S$PBB,,, | Performed by: NURSE PRACTITIONER

## 2022-06-01 PROCEDURE — 93010 ELECTROCARDIOGRAM REPORT: CPT | Mod: S$PBB,,, | Performed by: INTERNAL MEDICINE

## 2022-06-01 PROCEDURE — 1159F MED LIST DOCD IN RCRD: CPT | Mod: CPTII,,, | Performed by: NURSE PRACTITIONER

## 2022-06-01 PROCEDURE — 3008F BODY MASS INDEX DOCD: CPT | Mod: CPTII,,, | Performed by: NURSE PRACTITIONER

## 2022-06-01 PROCEDURE — 93010 EKG 12-LEAD: ICD-10-PCS | Mod: S$PBB,,, | Performed by: INTERNAL MEDICINE

## 2022-06-01 PROCEDURE — 99213 OFFICE O/P EST LOW 20 MIN: CPT | Mod: PBBFAC | Performed by: NURSE PRACTITIONER

## 2022-06-01 PROCEDURE — 3008F PR BODY MASS INDEX (BMI) DOCUMENTED: ICD-10-PCS | Mod: CPTII,,, | Performed by: NURSE PRACTITIONER

## 2022-06-01 PROCEDURE — 3078F PR MOST RECENT DIASTOLIC BLOOD PRESSURE < 80 MM HG: ICD-10-PCS | Mod: CPTII,,, | Performed by: NURSE PRACTITIONER

## 2022-06-01 PROCEDURE — 3078F DIAST BP <80 MM HG: CPT | Mod: CPTII,,, | Performed by: NURSE PRACTITIONER

## 2022-06-01 PROCEDURE — 1160F PR REVIEW ALL MEDS BY PRESCRIBER/CLIN PHARMACIST DOCUMENTED: ICD-10-PCS | Mod: CPTII,,, | Performed by: NURSE PRACTITIONER

## 2022-06-01 PROCEDURE — 93280 PM DEVICE PROGR EVAL DUAL: CPT | Mod: 26,,, | Performed by: INTERNAL MEDICINE

## 2022-06-01 PROCEDURE — 99214 OFFICE O/P EST MOD 30 MIN: CPT | Mod: S$PBB,,, | Performed by: NURSE PRACTITIONER

## 2022-06-01 PROCEDURE — 3074F PR MOST RECENT SYSTOLIC BLOOD PRESSURE < 130 MM HG: ICD-10-PCS | Mod: CPTII,,, | Performed by: NURSE PRACTITIONER

## 2022-06-01 PROCEDURE — 99999 PR PBB SHADOW E&M-EST. PATIENT-LVL III: ICD-10-PCS | Mod: PBBFAC,,, | Performed by: NURSE PRACTITIONER

## 2022-06-01 PROCEDURE — 93005 ELECTROCARDIOGRAM TRACING: CPT | Mod: PBBFAC | Performed by: INTERNAL MEDICINE

## 2022-06-01 PROCEDURE — 1159F PR MEDICATION LIST DOCUMENTED IN MEDICAL RECORD: ICD-10-PCS | Mod: CPTII,,, | Performed by: NURSE PRACTITIONER

## 2022-06-01 PROCEDURE — 93280 PM DEVICE PROGR EVAL DUAL: CPT

## 2022-06-01 PROCEDURE — 1160F RVW MEDS BY RX/DR IN RCRD: CPT | Mod: CPTII,,, | Performed by: NURSE PRACTITIONER

## 2022-06-01 PROCEDURE — 99999 PR PBB SHADOW E&M-EST. PATIENT-LVL III: CPT | Mod: PBBFAC,,, | Performed by: NURSE PRACTITIONER

## 2022-06-01 PROCEDURE — 93280 CARDIAC DEVICE CHECK - IN CLINIC & HOSPITAL: ICD-10-PCS | Mod: 26,,, | Performed by: INTERNAL MEDICINE

## 2022-06-01 PROCEDURE — 3074F SYST BP LT 130 MM HG: CPT | Mod: CPTII,,, | Performed by: NURSE PRACTITIONER

## 2022-06-01 RX ORDER — EMPAGLIFLOZIN 25 MG/1
25 TABLET, FILM COATED ORAL DAILY
COMMUNITY
Start: 2022-05-09

## 2022-06-01 RX ORDER — INSULIN DETEMIR 100 [IU]/ML
INJECTION, SOLUTION SUBCUTANEOUS
COMMUNITY

## 2023-11-14 NOTE — TELEPHONE ENCOUNTER
Spoke w/ pt & informed her that the ultrasound was scheduled & she did not show for appt. She stated that she is seeing her general cardiologist tomorrow & he ordered an xray. I informed pt to give us a call back to schedule ultrasound if xray did not show anything. Pt understood.  ----- Message from Nora Estevez sent at 6/18/2020  3:39 PM CDT -----   Patient stated that she wasn't given AVS after clinic. Her sleep study apparently has been scheduled at [facility]. Can someone arrange her US as she continues to complain of pain to her arm.     Thanks,  Rosana       Yes

## 2025-04-30 PROBLEM — F33.1 MODERATE EPISODE OF RECURRENT MAJOR DEPRESSIVE DISORDER: Status: ACTIVE | Noted: 2025-04-30

## 2025-05-01 PROBLEM — I10 ESSENTIAL HYPERTENSION: Chronic | Status: ACTIVE | Noted: 2020-02-25

## 2025-05-01 PROBLEM — E11.9 T2DM (TYPE 2 DIABETES MELLITUS): Chronic | Status: ACTIVE | Noted: 2020-02-24

## (undated) DEVICE — PACEMAKER SHEET

## (undated) DEVICE — SHEATH SAFESHEATH II ULTRA 6FR

## (undated) DEVICE — ADHESIVE DERMABOND ADVANCED

## (undated) DEVICE — DRAPE INCISE IOBAN 2 23X17IN

## (undated) DEVICE — PACK PACER PERMANENT

## (undated) DEVICE — PAD DEFIB CADENCE ADULT R2

## (undated) DEVICE — ELECTRODE REM PLYHSV RETURN 9